# Patient Record
Sex: FEMALE | Race: AMERICAN INDIAN OR ALASKA NATIVE
[De-identification: names, ages, dates, MRNs, and addresses within clinical notes are randomized per-mention and may not be internally consistent; named-entity substitution may affect disease eponyms.]

---

## 2017-01-24 ENCOUNTER — HOSPITAL ENCOUNTER (EMERGENCY)
Dept: HOSPITAL 5 - ED | Age: 72
LOS: 1 days | Discharge: HOME | End: 2017-01-25
Payer: MEDICARE

## 2017-01-24 VITALS — DIASTOLIC BLOOD PRESSURE: 104 MMHG | SYSTOLIC BLOOD PRESSURE: 155 MMHG

## 2017-01-24 DIAGNOSIS — R05: ICD-10-CM

## 2017-01-24 DIAGNOSIS — I10: ICD-10-CM

## 2017-01-24 DIAGNOSIS — E11.9: ICD-10-CM

## 2017-01-24 DIAGNOSIS — J06.9: Primary | ICD-10-CM

## 2017-01-24 PROCEDURE — 99282 EMERGENCY DEPT VISIT SF MDM: CPT

## 2017-01-24 NOTE — EMERGENCY DEPARTMENT REPORT
HPI





- General


Chief Complaint: Upper Respiratory Infection


Time Seen by Provider: 01/24/17 23:05





- HPI


HPI: 





Patient here complaining of cold symptoms says 1 week.  Reports nonproductive 

cough.  Denies any chest pain or shortness of breath.  Denies any fever or 

chills.  Patient is a history of diabetes and high blood pressure.  She is 

currently taking blood pressure medication.  She does have a primary care 

doctor.





ED Past Medical Hx





- Past Medical History


Previous Medical History?: Yes


Hx Hypertension: Yes


Hx Diabetes: Yes


Additional medical history: Thyroid Disease





- Surgical History


Past Surgical History?: Yes


Additional Surgical History: R shoulder, hiatal hernia repair





- Family History


Family history: diabetes, hypertension





- Social History


Smoking Status: Never Smoker


Substance Use Type: None





- Medications


Home Medications: 


 Home Medications











 Medication  Instructions  Recorded  Confirmed  Last Taken  Type


 


HYDROcodone/APAP 5-325 [[place] 1 each PO Q6HR PRN #20 tablet 01/16/14  Unknown Rx





5/325 mg]     


 


Hydrochlorothiazide [Hctz] 25 mg PO DAILY 01/16/14 01/16/14 01/15/14 09:00 

History


 


Lisinopril [Zestril TAB] 20 mg PO DAILY 01/16/14 01/16/14 Unknown History


 


metFORMIN [Glucophage] 1,000 mg PO BID 01/16/14 01/16/14 01/15/14 09:00 History


 


Phenazopyridine [Pyridium] 200 mg PO TID PRN #6 tablet 05/19/14  Unknown Rx


 


Sulfamethoxazole/Trimethoprim 1 each PO BID #10 tablet 05/19/14  Unknown Rx





[Bactrim Ds]     


 


Clotrimazole [Gyne-Lotrimin] 1 applicator VG QHS #1 cream.appl 05/22/14  

Unknown Rx


 


Naproxen Sodium [Aleve] 220 mg PO Q8H PRN #50 tablet 05/22/14  Unknown Rx


 


Azithromycin [Zithromax Z-YAN] 250 mg PO DAILY #6 tab 01/24/17  Unknown Rx


 


Cetirizine HCl [ZyrTEC] 10 mg PO QDAY #10 capsule 01/24/17  Unknown Rx


 


Fluticasone [Flonase] 1 spray NS QDAY #1 bottle 01/24/17  Unknown Rx


 


guaiFENesin/CODEINE [Robitussin AC] 10 ml PO QHS #70 ml 01/25/17  Unknown Rx














ED Review of Systems


ROS: 


Stated complaint: COLD SYMPTOMS


Other details as noted in HPI





Comment: All other systems reviewed and negative


Constitutional: denies: chills, fever


Eyes: denies: eye pain


ENT: congestion.  denies: ear pain, throat pain


Respiratory: cough.  denies: shortness of breath, SOB with exertion, SOB at rest

, stridor, wheezing


Cardiovascular: denies: chest pain, palpitations, edema, syncope


Gastrointestinal: denies: abdominal pain, nausea, vomiting


Skin: denies: rash


Neurological: denies: headache, weakness, numbness, paresthesias, confusion





Physical Exam





- Physical Exam


Vital Signs: 


 Vital Signs











  01/24/17 01/24/17





  19:13 22:42


 


Temperature 98.3 F 98.8 F


 


Pulse Rate 88 99 H


 


Respiratory 18 20





Rate  


 


Blood Pressure 156/103 


 


Blood Pressure  155/104





[Right]  


 


O2 Sat by Pulse 98 





Oximetry  











General: 





This is a 71-year-old female well-nourished well-developed in no acute distress.


Physical Exam: 





Head: Normocephalic atraumatic


Mouth: Moist, no pharyngeal exudate or erythema.  Uvula is midline and oral 

airway is patent.      No facial swelling.  No peritonsillar abscesses.


Neck: Supple, no C-spine tenderness, no tracheal deviation.  Nontender to 

palpate. no adenopathy


Ears: Bilateral TMs congested without erythema.bilateral EAC without any 

redness swelling or drainage


Eyes: Bilateral pupils equal and reactive to light, bilateral EOM intact.  

Bilateral  conjunctiva with injection.  Normal accommodation


Nose: Mucosa moist, congested with erythema.  Frontal and maxillary sinuses 

nontender to palpate.  Clear nasal drainage.  Dry cough


Lungs:Clear to auscultate bilaterally no rhonchi wheezes or rales.  Normal work 

of breathing 


extremity; No CCE. +2 pulses.  No neurovascular compromise


Cardiovascular: S1-S2, regular rate rhythm.  No murmurs.


Skin: clean Dry and intact no rash no lesions 


Psych: Normal mood and behavior





ED Course


 Vital Signs











  01/24/17 01/24/17





  19:13 22:42


 


Temperature 98.3 F 98.8 F


 


Pulse Rate 88 99 H


 


Respiratory 18 20





Rate  


 


Blood Pressure 156/103 


 


Blood Pressure  155/104





[Right]  


 


O2 Sat by Pulse 98 





Oximetry  














- Reevaluation(s)


Reevaluation #1: 





01/25/17 05:22


Patient received Tylenol 650 mg by mouth in emergency room for sore throat.





ED Medical Decision Making





- Medical Decision Making





ED course: Patient receives Tylenol 650 mg in emergency room for sore throat.  

The patient as she has upper respiratory tract infection which is usually viral 

in nature but because her symptoms have been going on for 1 week and she has 

multiple comorbidities to include diabetes and high blood pressure I will place 

her on antibiotic and nasal Flonase and she should take Zyrtec.  I instructed 

her that her blood pressure was elevated today so she will need to keep a log 

and follow up with primary care physician for treatment of chronic diseases.  

Patient discharged home with prescription for Z-Yan, guaifenesin with codeine, 

Flonase and Zyrtec.


Critical care attestation.: 


If time is entered above; I have spent that time in minutes in the direct care 

of this critically ill patient, excluding procedure time.








ED Disposition


Clinical Impression: 


 Cough





Upper respiratory tract infection


Qualifiers:


 URI type: unspecified URI Qualified Code(s): J06.9 - Acute upper respiratory 

infection, unspecified





Hypertension


Qualifiers:


 Hypertension type: essential hypertension Qualified Code(s): I10 - Essential (

primary) hypertension


Disposition: DISCHARGED TO HOME OR SELFCARE


Is pt being admited?: No


Does the pt Need Aspirin: No


Condition: Stable


Instructions:  Hypertension (ED), Upper Respiratory Infection (ED), Acute Cough 

(ED)


Additional Instructions: 


Please keep a log of her blood pressure and follow up with primary care 

physician.


Take medication as prescribed.


Prescriptions: 


guaiFENesin/CODEINE [Robitussin AC] 10 ml PO QHS #70 ml


Fluticasone [Flonase] 1 spray NS QDAY #1 bottle


Azithromycin [Zithromax Z-YAN] 250 mg PO DAILY #6 tab


Cetirizine HCl [ZyrTEC] 10 mg PO QDAY #10 capsule


Referrals: 


PRIMARY CARE,MD [Primary Care Provider] - 2-3 Days


Forms:  Work/School Release Form(ED)

## 2017-07-12 ENCOUNTER — HOSPITAL ENCOUNTER (EMERGENCY)
Dept: HOSPITAL 5 - ED | Age: 72
Discharge: HOME | End: 2017-07-12
Payer: MEDICARE

## 2017-07-12 VITALS — SYSTOLIC BLOOD PRESSURE: 147 MMHG | DIASTOLIC BLOOD PRESSURE: 79 MMHG

## 2017-07-12 DIAGNOSIS — Z88.0: ICD-10-CM

## 2017-07-12 DIAGNOSIS — Z88.8: ICD-10-CM

## 2017-07-12 DIAGNOSIS — E11.9: ICD-10-CM

## 2017-07-12 DIAGNOSIS — I10: ICD-10-CM

## 2017-07-12 DIAGNOSIS — R10.9: Primary | ICD-10-CM

## 2017-07-12 DIAGNOSIS — Z87.891: ICD-10-CM

## 2017-07-12 LAB
ALBUMIN SERPL-MCNC: 4.2 G/DL (ref 3.9–5)
ALBUMIN/GLOB SERPL: 1.2 %
ALP SERPL-CCNC: 92 UNITS/L (ref 35–129)
ALT SERPL-CCNC: 7 UNITS/L (ref 7–56)
ANION GAP SERPL CALC-SCNC: 15 MMOL/L
BASOPHILS NFR BLD AUTO: 0.6 % (ref 0–1.8)
BILIRUB SERPL-MCNC: 0.5 MG/DL (ref 0.1–1.2)
BILIRUB UR QL STRIP: (no result)
BLOOD UR QL VISUAL: (no result)
BUN SERPL-MCNC: 14 MG/DL (ref 7–17)
BUN/CREAT SERPL: 23.33 %
CALCIUM SERPL-MCNC: 9.6 MG/DL (ref 8.4–10.2)
CHLORIDE SERPL-SCNC: 91.8 MMOL/L (ref 98–107)
CO2 SERPL-SCNC: 29 MMOL/L (ref 22–30)
EOSINOPHIL NFR BLD AUTO: 1.9 % (ref 0–4.3)
GLUCOSE SERPL-MCNC: 331 MG/DL (ref 65–100)
HCT VFR BLD CALC: 42 % (ref 30.3–42.9)
HGB BLD-MCNC: 13.5 GM/DL (ref 10.1–14.3)
KETONES UR STRIP-MCNC: (no result) MG/DL
LEUKOCYTE ESTERASE UR QL STRIP: (no result)
LIPASE SERPL-CCNC: 28 UNITS/L (ref 13–60)
MCH RBC QN AUTO: 28 PG (ref 28–32)
MCHC RBC AUTO-ENTMCNC: 32 % (ref 30–34)
MCV RBC AUTO: 86 FL (ref 79–97)
MUCOUS THREADS #/AREA URNS HPF: (no result) /HPF
NITRITE UR QL STRIP: (no result)
PH UR STRIP: 5 [PH] (ref 5–7)
PLATELET # BLD: 287 K/MM3 (ref 140–440)
POTASSIUM SERPL-SCNC: 3.7 MMOL/L (ref 3.6–5)
PROT SERPL-MCNC: 7.6 G/DL (ref 6.3–8.2)
PROT UR STRIP-MCNC: (no result) MG/DL
RBC # BLD AUTO: 4.91 M/MM3 (ref 3.65–5.03)
RBC #/AREA URNS HPF: 2 /HPF (ref 0–6)
SODIUM SERPL-SCNC: 132 MMOL/L (ref 137–145)
UROBILINOGEN UR-MCNC: < 2 MG/DL (ref ?–2)
WBC # BLD AUTO: 7.8 K/MM3 (ref 4.5–11)
WBC #/AREA URNS HPF: 1 /HPF (ref 0–6)

## 2017-07-12 PROCEDURE — 83690 ASSAY OF LIPASE: CPT

## 2017-07-12 PROCEDURE — 99284 EMERGENCY DEPT VISIT MOD MDM: CPT

## 2017-07-12 PROCEDURE — 81001 URINALYSIS AUTO W/SCOPE: CPT

## 2017-07-12 PROCEDURE — 74177 CT ABD & PELVIS W/CONTRAST: CPT

## 2017-07-12 PROCEDURE — 36415 COLL VENOUS BLD VENIPUNCTURE: CPT

## 2017-07-12 PROCEDURE — 85025 COMPLETE CBC W/AUTO DIFF WBC: CPT

## 2017-07-12 PROCEDURE — 80053 COMPREHEN METABOLIC PANEL: CPT

## 2017-07-12 PROCEDURE — 82962 GLUCOSE BLOOD TEST: CPT

## 2017-07-12 NOTE — CAT SCAN REPORT
FINAL REPORT



PROCEDURE:  CT abdomen and pelvis with contrast. 



TECHNIQUE:  Computerized axial tomography of the abdomen and

pelvis was performed after the IV injection of iodinated nonionic

contrast.



HISTORY:  Right flank pain. 



COMPARISON:  No prior studies are available for comparison.



FINDINGS:  

The lung bases are clear. There are no pleural effusions. The

heart size is normal. The liver, spleen and pancreas appear

normal. The gallbladder is present. The adrenal glands are not

enlarged. Both kidneys appear normal in size and configuration.

The abdominal aorta has a normal caliber. There is no

retroperitoneal adenopathy. There is a small hiatal hernia. The

unopacified gastrointestinal tract is unremarkable. A normal

appendix is visible. The bladder, uterus and adnexal regions are

unremarkable. The regional skeleton appears intact. There is

severe osteoarthritis in the facet joints of the lower lumbar

spine. 



IMPRESSION:  

Small hiatal hernia. Osteoarthritis involving the facet joints of

the lower lumbar spine. No definite signs of acute disease in the

abdomen or pelvis.

## 2017-07-12 NOTE — EMERGENCY DEPARTMENT REPORT
ED Abdominal Pain HPI





- General


Chief Complaint: Abdominal Pain


Stated Complaint: RT SIDE PAIN


Time Seen by Provider: 07/12/17 21:18


Source: patient


Mode of arrival: Ambulatory


Limitations: No Limitations





- History of Present Illness


Initial Comments: 





72-year-old female presents to the emergency department complaining of right 

flank pain.  Patient states pain has been constant for 10 days.  Pain does not 

radiate.  Initially, the pain was severe with associated nausea.  She states 

that she stopped taking her Invokana and her pain decreased.  She now describes 

the pain as a pinching sensation.  She reports her nausea has resolved.  She 

denies fever.  There are no other complaints.


MD Complaint: flank pain


-: Gradual, days(s) (10)


Location: R flank


Radiation: none


Migration to: no migration


Severity: moderate


Severity scale (0 -10): 6


Quality: other ("pinching")


Consistency: constant


Improves With: nothing


Worsens With: nothing


Associated Symptoms: nausea





- Related Data


 Home Medications











 Medication  Instructions  Recorded  Confirmed  Last Taken


 


Hydrochlorothiazide [HCTZ] 25 mg PO DAILY 01/16/14 01/16/14 07/11/17


 


Lisinopril [Zestril TAB] 20 mg PO DAILY 01/16/14 01/16/14 07/11/17


 


metFORMIN [Glucophage] 1,000 mg PO BID 01/16/14 01/16/14 07/11/17








 Previous Rx's











 Medication  Instructions  Recorded  Last Taken  Type


 


Cetirizine HCl [ZyrTEC] 10 mg PO QDAY #10 capsule 01/24/17 07/11/17 Rx


 


Fluticasone [Flonase] 1 spray NS QDAY #1 bottle 01/24/17 07/11/17 Rx


 


traMADol [Ultram] 50 mg PO Q6HR PRN #20 tablet 07/12/17 Unknown Rx











 Allergies











Allergy/AdvReac Type Severity Reaction Status Date / Time


 


metronidazole [From Flagyl] Allergy  Unknown Verified 05/18/14 23:27


 


Metronidazole HCl Allergy  Unknown Verified 05/18/14 23:27





[From Flagyl]     


 


Penicillins Allergy  Anaphylaxis Verified 01/16/14 02:01














ED Review of Systems


ROS: 


Stated complaint: RT SIDE PAIN


Other details as noted in HPI





Comment: All other systems reviewed and negative


Gastrointestinal: as per HPI (R flank pain), nausea





ED Past Medical Hx





- Past Medical History


Previous Medical History?: Yes


Hx Hypertension: Yes


Hx Diabetes: Yes


Additional medical history: Thyroid Disease





- Surgical History


Past Surgical History?: Yes


Additional Surgical History: R shoulder, hiatal hernia repair





- Family History


Family history: no significant





- Social History


Smoking Status: Former Smoker


Substance Use Type: Non Opiate Pain, Prescribed





- Medications


Home Medications: 


 Home Medications











 Medication  Instructions  Recorded  Confirmed  Last Taken  Type


 


Hydrochlorothiazide [HCTZ] 25 mg PO DAILY 01/16/14 01/16/14 07/11/17 History


 


Lisinopril [Zestril TAB] 20 mg PO DAILY 01/16/14 01/16/14 07/11/17 History


 


metFORMIN [Glucophage] 1,000 mg PO BID 01/16/14 01/16/14 07/11/17 History


 


Cetirizine HCl [ZyrTEC] 10 mg PO QDAY #10 capsule 01/24/17 07/11/17 Rx


 


Fluticasone [Flonase] 1 spray NS QDAY #1 bottle 01/24/17 07/11/17 Rx


 


traMADol [Ultram] 50 mg PO Q6HR PRN #20 tablet 07/12/17  Unknown Rx














ED Physical Exam





- General


Limitations: No Limitations


General appearance: alert, in no apparent distress





- Head


Head exam: Present: atraumatic, normocephalic





- Eye


Eye exam: Present: normal appearance, PERRL, EOMI





- ENT


ENT exam: Present: normal exam, normal orophraynx, mucous membranes moist





- Neck


Neck exam: Present: normal inspection, full ROM.  Absent: tenderness





- Respiratory


Respiratory exam: Present: normal lung sounds bilaterally.  Absent: respiratory 

distress





- Cardiovascular


Cardiovascular Exam: Present: regular rate, normal rhythm, normal heart sounds





- GI/Abdominal


GI/Abdominal exam: Present: soft, normal bowel sounds.  Absent: distended, 

tenderness





- Extremities Exam


Extremities exam: Present: normal inspection, full ROM.  Absent: tenderness





- Back Exam


Back exam: Present: normal inspection, full ROM.  Absent: tenderness, CVA 

tenderness (R), CVA tenderness (L)





- Neurological Exam


Neurological exam: Present: alert, oriented X3.  Absent: motor sensory deficit





- Skin


Skin exam: Present: warm, dry, intact





ED Course


 Vital Signs











  07/12/17 07/12/17 07/12/17





  11:57 20:12 20:53


 


Temperature 98.4 F 98.5 F 


 


Pulse Rate 68 67 


 


Respiratory 18 20 





Rate   


 


Blood Pressure 176/92 152/92 


 


Blood Pressure   





[Right]   


 


O2 Sat by Pulse 97 98 100





Oximetry   














  07/12/17 07/12/17 07/12/17





  21:01 21:11 21:14


 


Temperature   


 


Pulse Rate 63 59 L 


 


Respiratory 14 15 





Rate   


 


Blood Pressure 161/77 161/77 


 


Blood Pressure   160/80





[Right]   


 


O2 Sat by Pulse 98 98 





Oximetry   














  07/12/17 07/12/17 07/12/17





  21:21 21:30 22:19


 


Temperature   


 


Pulse Rate 66 65 71


 


Respiratory 18 15 11 L





Rate   


 


Blood Pressure 161/76 142/71 


 


Blood Pressure   





[Right]   


 


O2 Sat by Pulse 97 99 100





Oximetry   














  07/12/17 07/12/17





  22:20 22:30


 


Temperature  


 


Pulse Rate 70 67


 


Respiratory 16 15





Rate  


 


Blood Pressure 162/80 146/75


 


Blood Pressure  





[Right]  


 


O2 Sat by Pulse 99 97





Oximetry  














ED Medical Decision Making





- Lab Data


Result diagrams: 


 07/12/17 12:31





 07/12/17 12:31





- Radiology Data


Radiology results: report reviewed, image reviewed





CT of abdomen and pelvis reveals a small hiatal hernia.  There is no acute intra

-abdominal abnormality.





- Medical Decision Making





Lab and imaging results reviewed and discussed with the patient.  Patient will 

be discharged home at this time to follow up with her primary care physician.





- Differential Diagnosis


abdominal pain, pyelonephritis, kidney stone


Critical care attestation.: 


If time is entered above; I have spent that time in minutes in the direct care 

of this critically ill patient, excluding procedure time.








ED Disposition


Clinical Impression: 


 Right flank pain





Disposition: DC-01 TO HOME OR SELFCARE


Is pt being admited?: No


Condition: Stable


Instructions:  Abdominal Pain (ED)


Prescriptions: 


traMADol [Ultram] 50 mg PO Q6HR PRN #20 tablet


 PRN Reason: Pain


Referrals: 


DONOVAN OLEARY MD [Primary Care Provider] - 3-5 Days


Time of Disposition: 22:56

## 2021-02-06 ENCOUNTER — HOSPITAL ENCOUNTER (EMERGENCY)
Dept: HOSPITAL 5 - ED | Age: 76
Discharge: HOME | End: 2021-02-06
Payer: MEDICARE

## 2021-02-06 VITALS — SYSTOLIC BLOOD PRESSURE: 117 MMHG | DIASTOLIC BLOOD PRESSURE: 75 MMHG

## 2021-02-06 DIAGNOSIS — J18.9: Primary | ICD-10-CM

## 2021-02-06 DIAGNOSIS — Z20.822: ICD-10-CM

## 2021-02-06 DIAGNOSIS — Z88.8: ICD-10-CM

## 2021-02-06 DIAGNOSIS — Z88.0: ICD-10-CM

## 2021-02-06 DIAGNOSIS — Z79.899: ICD-10-CM

## 2021-02-06 DIAGNOSIS — Z98.890: ICD-10-CM

## 2021-02-06 DIAGNOSIS — I10: ICD-10-CM

## 2021-02-06 DIAGNOSIS — E11.9: ICD-10-CM

## 2021-02-06 LAB
ALBUMIN SERPL-MCNC: 3.7 G/DL (ref 3.9–5)
ALT SERPL-CCNC: 7 UNITS/L (ref 7–56)
APTT BLD: 29.8 SEC. (ref 24.2–36.6)
BASOPHILS # (AUTO): 0.1 K/MM3 (ref 0–0.1)
BASOPHILS NFR BLD AUTO: 0.8 % (ref 0–1.8)
BUN SERPL-MCNC: 15 MG/DL (ref 7–17)
BUN/CREAT SERPL: 25 %
CALCIUM SERPL-MCNC: 9.1 MG/DL (ref 8.4–10.2)
EOSINOPHIL # BLD AUTO: 0.1 K/MM3 (ref 0–0.4)
EOSINOPHIL NFR BLD AUTO: 1.4 % (ref 0–4.3)
HCT VFR BLD CALC: 38.9 % (ref 30.3–42.9)
HEMOLYSIS INDEX: 2
HGB BLD-MCNC: 12.9 GM/DL (ref 10.1–14.3)
INR PPP: 1.04 (ref 0.87–1.13)
LYMPHOCYTES # BLD AUTO: 2.9 K/MM3 (ref 1.2–5.4)
LYMPHOCYTES NFR BLD AUTO: 30.5 % (ref 13.4–35)
MCHC RBC AUTO-ENTMCNC: 33 % (ref 30–34)
MCV RBC AUTO: 88 FL (ref 79–97)
MONOCYTES # (AUTO): 0.8 K/MM3 (ref 0–0.8)
MONOCYTES % (AUTO): 8.5 % (ref 0–7.3)
PLATELET # BLD: 275 K/MM3 (ref 140–440)
RBC # BLD AUTO: 4.41 M/MM3 (ref 3.65–5.03)

## 2021-02-06 PROCEDURE — 36415 COLL VENOUS BLD VENIPUNCTURE: CPT

## 2021-02-06 PROCEDURE — 84484 ASSAY OF TROPONIN QUANT: CPT

## 2021-02-06 PROCEDURE — 85730 THROMBOPLASTIN TIME PARTIAL: CPT

## 2021-02-06 PROCEDURE — 85025 COMPLETE CBC W/AUTO DIFF WBC: CPT

## 2021-02-06 PROCEDURE — 71046 X-RAY EXAM CHEST 2 VIEWS: CPT

## 2021-02-06 PROCEDURE — 80053 COMPREHEN METABOLIC PANEL: CPT

## 2021-02-06 PROCEDURE — 93005 ELECTROCARDIOGRAM TRACING: CPT

## 2021-02-06 PROCEDURE — 85610 PROTHROMBIN TIME: CPT

## 2021-02-06 NOTE — XRAY REPORT
CHEST 2 VIEW 



INDICATION / CLINICAL INFORMATION:

Chest Pain.



COMPARISON: 

12/16/2018



FINDINGS:



SUPPORT DEVICES: None.



HEART / MEDIASTINUM: No significant abnormality. 



LUNGS / PLEURA: Mild pleural-parenchymal disease is present in the left lung base. This appearance is
 nonspecific but certainly concerning for the possibility of pneumonia with very small parapneumonic 
effusion. Right lung is grossly clear. No pneumothorax. 



ADDITIONAL FINDINGS: No significant additional findings.



IMPRESSION:

1. Mild left basilar pleural-parenchymal disease.



Signer Name: Maliha Molina MD 

Signed: 2/6/2021 2:49 PM

Workstation Name: [email or web address]

## 2021-02-06 NOTE — EVENT NOTE
ED Screening Note


ED Screening Note: 





pt presents for left neck, left shoulder, and left CP for a week


states it feels like an aching


she states she has recently had two "colds"


no n/v/d


no fever


no cough 


no leg swelling


states she does have pain with breathing 


PMHx HTN, DM, hiatal hernia, goiter


allergy: PCN





This initial assessment/diagnostic orders/clinical plan/treatment(s) is/are 

subject to change based on patients health status, clinical progression and re-

assessment by fellow clinical providers in the ED. Further treatment and workup 

at subsequent clinical providers discretion. Patient/guardian urged not to elope

from the ED as their condition may be serious if not clinically assessed and 

managed. 





Initial orders include: 


CP protocol

## 2021-02-06 NOTE — EMERGENCY DEPARTMENT REPORT
- General


Chief Complaint: Chest Pain


Stated Complaint: NECK BACK PAINS


Time Seen by Provider: 21 14:20


Source: patient


Mode of arrival: Ambulatory


Limitations: No Limitations





- History of Present Illness


Initial Comments: 





Patient is a 75-year-old F American female with a past medical history of 

hypertension diabetes who is presenting with 2 weeks of cough and congestion.  

She states she has had cold-like symptoms for this duration.  States there is no

shortness of breath.  Cough is dry.  Initially had a slight sore throat but this

is resolved.  Patient had a COVID-19 test approximately 3 weeks ago which was 

negative.  Patient is not had retesting.  Patient is complaining of some 

discomfort with her cough in the left chest underneath the left breast.  States 

is a little bit of trapezius pain on the left as well.  She denies nausea 

vomiting diarrhea.  No subjective fevers or chills.





- Related Data


                                Home Medications











 Medication  Instructions  Recorded  Confirmed  Last Taken


 


hydroCHLOROthiazide [HCTZ] 25 mg PO DAILY 14


 


lisinopriL [Zestril TAB] 20 mg PO DAILY 14


 


metFORMIN [Glucophage] 1,000 mg PO BID 14








                                  Previous Rx's











 Medication  Instructions  Recorded  Last Taken  Type


 


Cetirizine HCl [ZyrTEC 10mg cap] 10 mg PO QDAY #10 capsule 17 Rx


 


Fluticasone [Flonase] 1 spray NS QDAY #1 bottle 17 Rx


 


traMADoL [Ultram] 50 mg PO Q6HR PRN #20 tablet 17 Unknown Rx


 


Benzonatate [Tessalon Perle] 100 mg PO TID #15 capsule 18 Unknown Rx


 


Dexchlorpheniram/Phenylephrine 1 each PO Q6H #20 tab 18 Unknown Rx





[Rymed Tablet]    


 


Albuterol Mdi (or & Nicu Only) 2 puff IH QID PRN #1 inhalation 21 Unknown 

Rx





[ProAir HFA Inhaler]    


 


Benzonatate [Tessalon Perles] 100 mg PO Q8HR #10 capsule 21 Unknown Rx


 


levoFLOXacin [Levaquin TAB] 500 mg PO QDAY #7 tablet 21 Unknown Rx


 


traMADoL [Ultram] 50 mg PO Q6HR PRN #12 tablet 21 Unknown Rx











                                    Allergies











Allergy/AdvReac Type Severity Reaction Status Date / Time


 


metronidazole [From Flagyl] Allergy  Unknown Verified 14 23:27


 


Metronidazole HCl Allergy  Unknown Verified 14 23:27





[From Flagyl]     


 


Penicillins Allergy  Anaphylaxis Verified 14 02:01














ED Review of Systems


ROS: 


Stated complaint: NECK BACK PAINS


Other details as noted in HPI





Comment: All other systems reviewed and negative





ED Past Medical Hx





- Past Medical History


Previous Medical History?: Yes


Hx Hypertension: Yes


Hx Diabetes: Yes


Additional medical history: Thyroid Disease, Fatty tumors left thigh





- Surgical History


Past Surgical History?: Yes


Additional Surgical History: R shoulder, hiatal hernia repair, Cataracts





- Social History


Smoking Status: Never Smoker


Substance Use Type: None





- Medications


Home Medications: 


                                Home Medications











 Medication  Instructions  Recorded  Confirmed  Last Taken  Type


 


hydroCHLOROthiazide [HCTZ] 25 mg PO DAILY 14 History


 


lisinopriL [Zestril TAB] 20 mg PO DAILY 14 History


 


metFORMIN [Glucophage] 1,000 mg PO BID 14 History


 


Cetirizine HCl [ZyrTEC 10mg cap] 10 mg PO QDAY #10 capsule 17 Rx


 


Fluticasone [Flonase] 1 spray NS QDAY #1 bottle 17 Rx


 


traMADoL [Ultram] 50 mg PO Q6HR PRN #20 tablet 17  Unknown Rx


 


Benzonatate [Tessalon Perle] 100 mg PO TID #15 capsule 18  Unknown Rx


 


Dexchlorpheniram/Phenylephrine 1 each PO Q6H #20 tab 18  Unknown Rx





[Rymed Tablet]     


 


Albuterol Mdi (or & Nicu Only) 2 puff IH QID PRN #1 inhalation 21  Unknown

Rx





[ProAir HFA Inhaler]     


 


Benzonatate [Tessalon Perles] 100 mg PO Q8HR #10 capsule 21  Unknown Rx


 


levoFLOXacin [Levaquin TAB] 500 mg PO QDAY #7 tablet 21  Unknown Rx


 


traMADoL [Ultram] 50 mg PO Q6HR PRN #12 tablet 21  Unknown Rx














ED Physical Exam





- General


Limitations: No Limitations


General appearance: alert, in no apparent distress





- Head


Head exam: Present: atraumatic, normocephalic





- Eye


Eye exam: Present: normal appearance





- ENT


ENT exam: Present: mucous membranes moist





- Neck


Neck exam: Present: normal inspection





- Respiratory


Respiratory exam: Present: normal lung sounds bilaterally.  Absent: respiratory 

distress, wheezes, rales, rhonchi





- Cardiovascular


Cardiovascular Exam: Present: regular rate, normal rhythm, normal heart sounds. 

 Absent: systolic murmur, diastolic murmur, rubs, gallop





- GI/Abdominal


GI/Abdominal exam: Present: soft, normal bowel sounds.  Absent: distended, tend

erness, guarding, rebound, rigid





- Extremities Exam


Extremities exam: Present: normal inspection





- Back Exam


Back exam: Present: normal inspection





- Neurological Exam


Neurological exam: Present: alert, oriented X3





- Psychiatric


Psychiatric exam: Present: normal affect, normal mood





- Skin


Skin exam: Present: warm, dry, intact, normal color.  Absent: rash





ED Course


                                   Vital Signs











  21





  13:55


 


Temperature 99.4 F


 


Pulse Rate 63


 


Respiratory 18





Rate 


 


Blood Pressure 117/75


 


O2 Sat by Pulse 96





Oximetry 














ED Medical Decision Making





- Lab Data


Result diagrams: 


                                 21 14:46





                                 21 14:46








                                   Lab Results











  21 Range/Units





  14:46 14:46 14:46 


 


WBC  9.5    (4.5-11.0)  K/mm3


 


RBC  4.41    (3.65-5.03)  M/mm3


 


Hgb  12.9    (10.1-14.3)  gm/dl


 


Hct  38.9    (30.3-42.9)  %


 


MCV  88    (79-97)  fl


 


MCH  29    (28-32)  pg


 


MCHC  33    (30-34)  %


 


RDW  15.1    (13.2-15.2)  %


 


Plt Count  275    (140-440)  K/mm3


 


Lymph % (Auto)  30.5    (13.4-35.0)  %


 


Mono % (Auto)  8.5 H    (0.0-7.3)  %


 


Eos % (Auto)  1.4    (0.0-4.3)  %


 


Baso % (Auto)  0.8    (0.0-1.8)  %


 


Lymph # (Auto)  2.9    (1.2-5.4)  K/mm3


 


Mono # (Auto)  0.8    (0.0-0.8)  K/mm3


 


Eos # (Auto)  0.1    (0.0-0.4)  K/mm3


 


Baso # (Auto)  0.1    (0.0-0.1)  K/mm3


 


Seg Neutrophils %  58.8    (40.0-70.0)  %


 


Seg Neutrophils #  5.6    (1.8-7.7)  K/mm3


 


PT   13.5   (12.2-14.9)  Sec.


 


INR   1.04   (0.87-1.13)  


 


APTT   29.8   (24.2-36.6)  Sec.


 


Sodium    135 L  (137-145)  mmol/L


 


Potassium    3.9  (3.6-5.0)  mmol/L


 


Chloride    98.8  ()  mmol/L


 


Carbon Dioxide    30  (22-30)  mmol/L


 


Anion Gap    10  mmol/L


 


BUN    15  (7-17)  mg/dL


 


Creatinine    0.6  (0.6-1.2)  mg/dL


 


Estimated GFR    > 60  ml/min


 


BUN/Creatinine Ratio    25  %


 


Glucose    229 H  ()  mg/dL


 


Calcium    9.1  (8.4-10.2)  mg/dL


 


Total Bilirubin    1.00  (0.1-1.2)  mg/dL


 


AST    13  (5-40)  units/L


 


ALT    7  (7-56)  units/L


 


Alkaline Phosphatase    104  ()  units/L


 


Troponin T    < 0.010  (0.00-0.029)  ng/mL


 


Total Protein    7.6  (6.3-8.2)  g/dL


 


Albumin    3.7 L  (3.9-5)  g/dL


 


Albumin/Globulin Ratio    0.9  %














- EKG Data


-: EKG Interpreted by Me


EKG shows normal: sinus rhythm, axis, intervals, QRS complexes, ST-T waves


Rate: normal





- EKG Data


Interpretation: LVH





- Radiology Data





Patient: PAOLO LARA MR#: [id number] 


 [id number] 


 : 1945 Acct:[id number] 





 Age/Sex: 75 / F ADM Date: 21 





 Loc: ED 


 Attending Dr: 








 Ordering Physician: LUAN MAGANA 


 Date of Service: 21 


 Procedure(s): XR chest routine 2V 


 Accession Number(s): [id number] 





 cc: LUAN MAGANA 





 Fluoro Time In Minutes: 





 CHEST 2 VIEW 





INDICATION / CLINICAL INFORMATION: 


Chest Pain. 





COMPARISON: 


2018 





FINDINGS: 





SUPPORT DEVICES: None. 





HEART / MEDIASTINUM: No significant abnormality. 





LUNGS / PLEURA: Mild pleural-parenchymal disease is present in the left lung 

base. This appearance 


 is nonspecific but certainly concerning for the possibility of pneumonia with 

very small parapneu


 mirian effusion. Right lung is grossly clear. No pneumothorax. 





ADDITIONAL FINDINGS: No significant additional findings. 





IMPRESSION: 


1. Mild left basilar pleural-parenchymal disease. 





Signer Name: Maliha Molina MD 


Signed: 2021 2:49 PM 


Workstation Name: [id number] 








 Transcribed By: JR 


 Dictated By: Maliha Molina MD 


 Electronically Authenticated By: Maliha Molina MD 


 Signed Date/Time: 21 1449 





- Medical Decision Making





Course the patient may have COVID-19 however the chest x-ray is most consistent 

with atypical pneumonia likely bacterial.  She does not have a widespread 

bibasilar groundglass opacity type pattern.  Patient is not hypoxic at this 

time.  Patient given a dose of Levaquin and will be discharged with Levaquin 

since his bioavailability is the same as IV.  Patient urged to get a second 

COVID-19 test but will also be given medication for symptomatic relief.  Patient

 discharged home.


Critical care attestation.: 


If time is entered above; I have spent that time in minutes in the direct care 

of this critically ill patient, excluding procedure time.








ED Disposition


Clinical Impression: 


 Atypical pneumonia, Suspected 2019 novel coronavirus infection





Disposition: DC-01 TO HOME OR SELFCARE


Is pt being admited?: No


Does the pt Need Aspirin: No


Condition: Stable


Instructions:  COVID-19 Frequently Asked Questions, Community-Acquired 

Pneumonia, Adult


Referrals: 


PRIMARY CARE,MD [Primary Care Provider] - 3-5 Days


Time of Disposition: 16:14

## 2021-03-16 ENCOUNTER — HOSPITAL ENCOUNTER (EMERGENCY)
Dept: HOSPITAL 5 - ED | Age: 76
Discharge: HOME | End: 2021-03-16
Payer: MEDICARE

## 2021-03-16 VITALS — DIASTOLIC BLOOD PRESSURE: 58 MMHG | SYSTOLIC BLOOD PRESSURE: 143 MMHG

## 2021-03-16 DIAGNOSIS — Z88.0: ICD-10-CM

## 2021-03-16 DIAGNOSIS — I10: ICD-10-CM

## 2021-03-16 DIAGNOSIS — Z79.84: ICD-10-CM

## 2021-03-16 DIAGNOSIS — Z98.890: ICD-10-CM

## 2021-03-16 DIAGNOSIS — Z79.899: ICD-10-CM

## 2021-03-16 DIAGNOSIS — B97.89: ICD-10-CM

## 2021-03-16 DIAGNOSIS — E11.65: ICD-10-CM

## 2021-03-16 DIAGNOSIS — Z88.8: ICD-10-CM

## 2021-03-16 DIAGNOSIS — R05: ICD-10-CM

## 2021-03-16 DIAGNOSIS — J06.9: Primary | ICD-10-CM

## 2021-03-16 LAB
ALBUMIN SERPL-MCNC: 3.8 G/DL (ref 3.9–5)
ALT SERPL-CCNC: 8 UNITS/L (ref 7–56)
BASOPHILS # (AUTO): 0.1 K/MM3 (ref 0–0.1)
BASOPHILS NFR BLD AUTO: 0.8 % (ref 0–1.8)
BUN SERPL-MCNC: 15 MG/DL (ref 7–17)
BUN/CREAT SERPL: 21 %
CALCIUM SERPL-MCNC: 9.3 MG/DL (ref 8.4–10.2)
EOSINOPHIL # BLD AUTO: 0.2 K/MM3 (ref 0–0.4)
EOSINOPHIL NFR BLD AUTO: 2.8 % (ref 0–4.3)
HCT VFR BLD CALC: 41.2 % (ref 30.3–42.9)
HEMOLYSIS INDEX: 14
HGB BLD-MCNC: 13.9 GM/DL (ref 10.1–14.3)
LYMPHOCYTES # BLD AUTO: 2.4 K/MM3 (ref 1.2–5.4)
LYMPHOCYTES NFR BLD AUTO: 28.2 % (ref 13.4–35)
MCHC RBC AUTO-ENTMCNC: 34 % (ref 30–34)
MCV RBC AUTO: 88 FL (ref 79–97)
MONOCYTES # (AUTO): 0.9 K/MM3 (ref 0–0.8)
MONOCYTES % (AUTO): 10.8 % (ref 0–7.3)
PLATELET # BLD: 275 K/MM3 (ref 140–440)
RBC # BLD AUTO: 4.7 M/MM3 (ref 3.65–5.03)

## 2021-03-16 PROCEDURE — 36415 COLL VENOUS BLD VENIPUNCTURE: CPT

## 2021-03-16 PROCEDURE — 80053 COMPREHEN METABOLIC PANEL: CPT

## 2021-03-16 PROCEDURE — 85025 COMPLETE CBC W/AUTO DIFF WBC: CPT

## 2021-03-16 PROCEDURE — 71046 X-RAY EXAM CHEST 2 VIEWS: CPT

## 2021-03-16 NOTE — XRAY REPORT
CHEST 2 VIEWS 



INDICATION / CLINICAL INFORMATION:

MAIN.



COMPARISON: 

To 621



FINDINGS:



SUPPORT DEVICES: None.

HEART / MEDIASTINUM: No significant abnormality. 

LUNGS / PLEURA: No significant pulmonary or pleural abnormality. No pneumothorax. 



ADDITIONAL FINDINGS: No significant additional findings.



IMPRESSION:

No significant abnormality.



Signer Name: Alverto Nicole MD FACR 

Signed: 3/16/2021 8:34 PM

Workstation Name: [id number]

## 2021-03-16 NOTE — EMERGENCY DEPARTMENT REPORT
- General


Chief Complaint: Upper Respiratory Infection


Stated Complaint: COUGHING


Time Seen by Provider: 03/16/21 19:44


Source: patient


Mode of arrival: Ambulatory


Limitations: No Limitations





- History of Present Illness


Initial Comments: 





Patient is a 75-year-old female presents emergency room planes of dry cough that

began a week ago.  She denies any productive cough.  She states that her 

granddaughter was sick with similar symptoms approximately a week before and she

got sick shortly after.  She denies any nausea, vomiting, diarrhea, fever, 

abdominal pain, chest pain, shortness of breath, leg swelling.  Past medical 

history of diabetes and hypertension.  She is not compliant with the diet for 

diabetes or hypertension.  She has an allergy to Flagyl and penicillin.  She s

tates that she is a non-smoker.  She states that last month she had "walking 

pneumonia diagnosed in the emergency department".





- Related Data


                                Home Medications











 Medication  Instructions  Recorded  Confirmed  Last Taken


 


hydroCHLOROthiazide [HCTZ] 25 mg PO DAILY 01/16/14 01/16/14 07/11/17


 


lisinopriL [Zestril TAB] 20 mg PO DAILY 01/16/14 01/16/14 07/11/17


 


metFORMIN [Glucophage] 1,000 mg PO BID 01/16/14 01/16/14 07/11/17








                                  Previous Rx's











 Medication  Instructions  Recorded  Last Taken  Type


 


Cetirizine HCl [ZyrTEC 10mg cap] 10 mg PO QDAY #10 capsule 01/24/17 07/11/17 Rx


 


Fluticasone [Flonase] 1 spray NS QDAY #1 bottle 01/24/17 07/11/17 Rx


 


traMADoL [Ultram] 50 mg PO Q6HR PRN #20 tablet 07/12/17 Unknown Rx


 


Benzonatate [Tessalon Perle] 100 mg PO TID #15 capsule 12/16/18 Unknown Rx


 


Dexchlorpheniram/Phenylephrine 1 each PO Q6H #20 tab 12/16/18 Unknown Rx





[Rymed Tablet]    


 


Albuterol Mdi (or & Nicu Only) 2 puff IH QID PRN #1 inhalation 02/06/21 Unknown 

Rx





[ProAir HFA Inhaler]    


 


Benzonatate [Tessalon Perles] 100 mg PO Q8HR #10 capsule 02/06/21 Unknown Rx


 


levoFLOXacin [Levaquin TAB] 500 mg PO QDAY #7 tablet 02/06/21 Unknown Rx


 


traMADoL [Ultram] 50 mg PO Q6HR PRN #12 tablet 02/06/21 Unknown Rx


 


Benzonatate [Tessalon Perles] 100 mg PO Q8HR PRN #12 capsule 03/16/21 Unknown Rx


 


Cetirizine HCl [ZyrTEC 10mg cap] 10 mg PO DAILY #10 capsule 03/16/21 Unknown Rx


 


guaiFENesin/DEXTROMETHORPHAN 1 each PO Q12HR PRN #12 capsule 03/16/21 Unknown Rx





[Coricidin Hbp Chest Charles-Cough]    











                                    Allergies











Allergy/AdvReac Type Severity Reaction Status Date / Time


 


metronidazole [From Flagyl] Allergy  Unknown Verified 05/18/14 23:27


 


Metronidazole HCl Allergy  Unknown Verified 05/18/14 23:27





[From Flagyl]     


 


Penicillins Allergy  Anaphylaxis Verified 01/16/14 02:01














ED Review of Systems


ROS: 


Stated complaint: COUGHING


Other details as noted in HPI





Comment: All other systems reviewed and negative





ED Past Medical Hx





- Past Medical History


Previous Medical History?: Yes


Hx Hypertension: Yes


Hx Diabetes: Yes


Additional medical history: Hypothyroid, Fatty tumors left thigh





- Surgical History


Past Surgical History?: Yes


Additional Surgical History: R shoulder, hiatal hernia repair, Cataracts





- Social History


Smoking Status: Never Smoker


Substance Use Type: None





- Medications


Home Medications: 


                                Home Medications











 Medication  Instructions  Recorded  Confirmed  Last Taken  Type


 


hydroCHLOROthiazide [HCTZ] 25 mg PO DAILY 01/16/14 01/16/14 07/11/17 History


 


lisinopriL [Zestril TAB] 20 mg PO DAILY 01/16/14 01/16/14 07/11/17 History


 


metFORMIN [Glucophage] 1,000 mg PO BID 01/16/14 01/16/14 07/11/17 History


 


Cetirizine HCl [ZyrTEC 10mg cap] 10 mg PO QDAY #10 capsule 01/24/17 07/11/17 Rx


 


Fluticasone [Flonase] 1 spray NS QDAY #1 bottle 01/24/17 07/11/17 Rx


 


traMADoL [Ultram] 50 mg PO Q6HR PRN #20 tablet 07/12/17  Unknown Rx


 


Benzonatate [Tessalon Perle] 100 mg PO TID #15 capsule 12/16/18  Unknown Rx


 


Dexchlorpheniram/Phenylephrine 1 each PO Q6H #20 tab 12/16/18  Unknown Rx





[Rymed Tablet]     


 


Albuterol Mdi (or & Nicu Only) 2 puff IH QID PRN #1 inhalation 02/06/21  Unknown

Rx





[ProAir HFA Inhaler]     


 


Benzonatate [Tessalon Perles] 100 mg PO Q8HR #10 capsule 02/06/21  Unknown Rx


 


levoFLOXacin [Levaquin TAB] 500 mg PO QDAY #7 tablet 02/06/21  Unknown Rx


 


traMADoL [Ultram] 50 mg PO Q6HR PRN #12 tablet 02/06/21  Unknown Rx


 


Benzonatate [Tessalon Perles] 100 mg PO Q8HR PRN #12 capsule 03/16/21  Unknown 

Rx


 


Cetirizine HCl [ZyrTEC 10mg cap] 10 mg PO DAILY #10 capsule 03/16/21  Unknown Rx


 


guaiFENesin/DEXTROMETHORPHAN 1 each PO Q12HR PRN #12 capsule 03/16/21  Unknown 

Rx





[Coricidin Hbp Chest Charles-Cough]     














ED Physical Exam





- General


Limitations: No Limitations


General appearance: alert, in no apparent distress





- Head


Head exam: Present: atraumatic, normocephalic





- Eye


Eye exam: Present: normal appearance





- ENT


ENT exam: Present: mucous membranes moist





- Respiratory


Respiratory exam: Present: normal lung sounds bilaterally.  Absent: respiratory 

distress, wheezes, rales, rhonchi, stridor, chest wall tenderness, accessory 

muscle use, decreased breath sounds, prolonged expiratory





- Cardiovascular


Cardiovascular Exam: Present: regular rate, normal rhythm, normal heart sounds. 

Absent: systolic murmur, diastolic murmur, rubs, gallop





- Neurological Exam


Neurological exam: Present: alert, oriented X3





- Psychiatric


Psychiatric exam: Present: normal affect, normal mood





- Skin


Skin exam: Present: warm, dry, intact





ED Course


                                   Vital Signs











  03/16/21





  19:30


 


Temperature 99.2 F


 


Pulse Rate 66


 


Respiratory 16





Rate 


 


Blood Pressure 143/58





[Right] 


 


O2 Sat by Pulse 97





Oximetry 














ED Medical Decision Making





- Lab Data


Result diagrams: 


                                 03/16/21 19:56





                                 03/16/21 19:56








                                   Vital Signs











  03/16/21





  19:30


 


Temperature 99.2 F


 


Pulse Rate 66


 


Respiratory 16





Rate 


 


Blood Pressure 143/58





[Right] 


 


O2 Sat by Pulse 97





Oximetry 











                                   Lab Results











  03/16/21 03/16/21 Range/Units





  19:56 19:56 


 


WBC  8.7   (4.5-11.0)  K/mm3


 


RBC  4.70   (3.65-5.03)  M/mm3


 


Hgb  13.9   (10.1-14.3)  gm/dl


 


Hct  41.2   (30.3-42.9)  %


 


MCV  88   (79-97)  fl


 


MCH  30   (28-32)  pg


 


MCHC  34   (30-34)  %


 


RDW  14.1   (13.2-15.2)  %


 


Plt Count  275   (140-440)  K/mm3


 


Lymph % (Auto)  28.2   (13.4-35.0)  %


 


Mono % (Auto)  10.8 H   (0.0-7.3)  %


 


Eos % (Auto)  2.8   (0.0-4.3)  %


 


Baso % (Auto)  0.8   (0.0-1.8)  %


 


Lymph # (Auto)  2.4   (1.2-5.4)  K/mm3


 


Mono # (Auto)  0.9 H   (0.0-0.8)  K/mm3


 


Eos # (Auto)  0.2   (0.0-0.4)  K/mm3


 


Baso # (Auto)  0.1   (0.0-0.1)  K/mm3


 


Seg Neutrophils %  57.4   (40.0-70.0)  %


 


Seg Neutrophils #  5.0   (1.8-7.7)  K/mm3


 


Sodium   134 L  (137-145)  mmol/L


 


Potassium   3.9  (3.6-5.0)  mmol/L


 


Chloride   95.5 L  ()  mmol/L


 


Carbon Dioxide   25  (22-30)  mmol/L


 


Anion Gap   17  mmol/L


 


BUN   15  (7-17)  mg/dL


 


Creatinine   0.7  (0.6-1.2)  mg/dL


 


Estimated GFR   > 60  ml/min


 


BUN/Creatinine Ratio   21  %


 


Glucose   394 H  ()  mg/dL


 


Calcium   9.3  (8.4-10.2)  mg/dL


 


Total Bilirubin   0.60  (0.1-1.2)  mg/dL


 


AST   17  (5-40)  units/L


 


ALT   8  (7-56)  units/L


 


Alkaline Phosphatase   110  ()  units/L


 


Total Protein   7.8  (6.3-8.2)  g/dL


 


Albumin   3.8 L  (3.9-5)  g/dL


 


Albumin/Globulin Ratio   1.0  %














- Radiology Data


Radiology results: report reviewed





Ordering Physician: LUAN MAGANA  


Date of Service: 03/16/21  


Procedure(s): XR chest routine 2V  


Accession Number(s): [id number]  


 


cc: LUAN MAGANA   


 


Fluoro Time In Minutes:   


 


CHEST 2 VIEWS   


 


 INDICATION / CLINICAL INFORMATION:  


 MAIN.  


 


 COMPARISON:   


 To 621  


 


 FINDINGS:  


 


 SUPPORT DEVICES: None.  


 HEART / MEDIASTINUM: No significant abnormality.   


 LUNGS / PLEURA: No significant pulmonary or pleural abnormality. No 

pneumothorax.   


 


 ADDITIONAL FINDINGS: No significant additional findings.  


 


 IMPRESSION:  


 No significant abnormality.  


 


 Signer Name: Alverto Nicole MD FACR   


 Signed: 3/16/2021 8:34 PM  


 Workstation Name: [id number]   


 


 


Transcribed By: MS  


Dictated By: Alverto Nicole MD  


Electronically Authenticated By: Alverto Nicole MD    


Signed Date/Time: 03/16/21 2034                                


 


 


 


DD/DT: 03/16/21 2033                                                            

  


TD/TT:


Print





- Medical Decision Making





Patient is a 75-year-old female presents emergency room planes of dry cough that

 began a week ago.  She denies any productive cough.  She states that her 

granddaughter was sick with similar symptoms approximately a week before and she

 got sick shortly after.  She denies any nausea, vomiting, diarrhea, fever, 

abdominal pain, chest pain, shortness of breath, leg swelling.  Past medical 

history of diabetes and hypertension.  She is not compliant with the diet for 

diabetes or hypertension.  She has an allergy to Flagyl and penicillin.  She 

states that she is a non-smoker.  She states that last month she had "walking 

pneumonia diagnosed in the emergency department".  Vitals are stable.  No 

tachycardia, no hypoxia, no fever.  Breath sounds are clear bilaterally, no 

wheezing, no rales, no rhonchi.  Chest x-ray:  No significant abnormality.  Labs

 with no leukocytosis.  Patient's blood glucose is 394, patient was eating a 

rice crispy and drinking a grape soda while in the emergency department, I 

discussed the importance of lifestyle modifications with patient and the 

importance of glycemic control and following up with her primary care doctor, I 

discussed with patient the risk of chronically uncontrolled diabetes including 

but not limited to loss of eyesight, renal disease, heart disease, etc. symptoms

 likely related to mild viral URI versus allergies.  Patient given prescription 

for Coricidin, Tessalon Perles, Zyrtec.  Advised patient Please take medication 

as prescribed.  Increase your water intake.  Please follow the diet for 

diabetes, low sugar/low carbohydrate diet.  Follow-up with your primary care 

doctor.  Return to emergency room for new or worsening symptoms.


Critical care attestation.: 


If time is entered above; I have spent that time in minutes in the direct care 

of this critically ill patient, excluding procedure time.








ED Disposition


Clinical Impression: 


 Viral URI with cough, Hyperglycemia





Disposition: DC-01 TO HOME OR SELFCARE


Is pt being admited?: No


Does the pt Need Aspirin: No


Condition: Stable


Instructions:  Hyperglycemia, Viral Respiratory Infection, Diabetes Mellitus and

 Nutrition, Adult


Additional Instructions: 


Please take medication as prescribed.  Increase your water intake.  Please 

follow the diet for diabetes, low sugar/low carbohydrate diet.  Follow-up with 

your primary care doctor.  Return to emergency room for new or worsening 

symptoms.


Prescriptions: 


guaiFENesin/DEXTROMETHORPHAN [Coricidin Hbp Chest Charles-Cough] 1 each PO Q12HR 

PRN #12 capsule


 PRN Reason: cough/congestion


Benzonatate [Tessalon Perles] 100 mg PO Q8HR PRN #12 capsule


 PRN Reason: cough


Cetirizine HCl [ZyrTEC 10mg cap] 10 mg PO DAILY #10 capsule


Referrals: 


your, primary care doctor [Other] - 2-3 Days


Time of Disposition: 21:00


Print Language: ENGLISH

## 2021-06-25 ENCOUNTER — HOSPITAL ENCOUNTER (EMERGENCY)
Dept: HOSPITAL 5 - ED | Age: 76
Discharge: HOME | End: 2021-06-25
Payer: MEDICARE

## 2021-06-25 VITALS — SYSTOLIC BLOOD PRESSURE: 125 MMHG | DIASTOLIC BLOOD PRESSURE: 83 MMHG

## 2021-06-25 DIAGNOSIS — Z79.899: ICD-10-CM

## 2021-06-25 DIAGNOSIS — Z79.84: ICD-10-CM

## 2021-06-25 DIAGNOSIS — I10: Primary | ICD-10-CM

## 2021-06-25 DIAGNOSIS — Z76.0: ICD-10-CM

## 2021-06-25 DIAGNOSIS — Z88.0: ICD-10-CM

## 2021-06-25 DIAGNOSIS — Z98.890: ICD-10-CM

## 2021-06-25 DIAGNOSIS — E11.9: ICD-10-CM

## 2021-06-25 DIAGNOSIS — Z88.8: ICD-10-CM

## 2021-06-25 PROCEDURE — 99281 EMR DPT VST MAYX REQ PHY/QHP: CPT

## 2021-11-11 ENCOUNTER — HOSPITAL ENCOUNTER (EMERGENCY)
Dept: HOSPITAL 5 - ED | Age: 76
LOS: 1 days | Discharge: HOME | End: 2021-11-12
Payer: MEDICARE

## 2021-11-11 DIAGNOSIS — Z79.899: ICD-10-CM

## 2021-11-11 DIAGNOSIS — M54.2: Primary | ICD-10-CM
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
## 2021-11-11 DIAGNOSIS — E11.9: ICD-10-CM
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
## 2021-11-11 DIAGNOSIS — M54.6: ICD-10-CM

## 2021-11-11 DIAGNOSIS — I10: ICD-10-CM
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
## 2021-11-11 DIAGNOSIS — Z88.0: ICD-10-CM

## 2021-11-11 DIAGNOSIS — Z88.8: ICD-10-CM
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
## 2021-11-11 PROCEDURE — 72070 X-RAY EXAM THORAC SPINE 2VWS: CPT

## 2021-11-11 PROCEDURE — 99284 EMERGENCY DEPT VISIT MOD MDM: CPT

## 2021-11-11 PROCEDURE — 72040 X-RAY EXAM NECK SPINE 2-3 VW: CPT
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
## 2021-11-12 VITALS — DIASTOLIC BLOOD PRESSURE: 72 MMHG | SYSTOLIC BLOOD PRESSURE: 117 MMHG

## 2022-01-28 NOTE — EMERGENCY DEPARTMENT REPORT
ED General Adult HPI





- General


Chief complaint: Medical Clearance


Stated complaint: RX REFILL


Time Seen by Provider: 06/25/21 12:48


Source: patient


Mode of arrival: Ambulatory


Limitations: No Limitations





- History of Present Illness


Initial comments: 





Patient is a 76-year-old female presents emergency room with complaints of a 

medication refill.  Patient states that she went to her primary care clinic and 

reports that it was close due to someone in the clinic having Covid.  She states

that she does not know the doses of her medications.  She states that she goes 

to the [facility] pharmacy in [facility].  She denies any symptoms at all 

currently.  She denies any pain.  She has an allergy to Flagyl and penicillin.  

I spoke with the pharmacist at [facility] in [place], [place], advised 

that patient takes losartan hydrochlorothiazide 100 mg / 12.5 mg daily, 

Synthroid 75 mg daily, glyburide 4 mg twice daily, Farxiga, 10 mg once daily, 

Ziac 6.25 once daily. 





- Related Data


                                Home Medications











 Medication  Instructions  Recorded  Confirmed  Last Taken


 


hydroCHLOROthiazide [HCTZ] 25 mg PO DAILY 01/16/14 01/16/14 07/11/17


 


lisinopriL [Zestril TAB] 20 mg PO DAILY 01/16/14 01/16/14 07/11/17


 


metFORMIN [Glucophage] 1,000 mg PO BID 01/16/14 01/16/14 07/11/17








                                  Previous Rx's











 Medication  Instructions  Recorded  Last Taken  Type


 


Cetirizine HCl [ZyrTEC 10mg cap] 10 mg PO QDAY #10 capsule 01/24/17 07/11/17 Rx


 


Fluticasone [Flonase] 1 spray NS QDAY #1 bottle 01/24/17 07/11/17 Rx


 


traMADoL [Ultram] 50 mg PO Q6HR PRN #20 tablet 07/12/17 Unknown Rx


 


Benzonatate [Tessalon Perle] 100 mg PO TID #15 capsule 12/16/18 Unknown Rx


 


Dexchlorpheniram/Phenylephrine 1 each PO Q6H #20 tab 12/16/18 Unknown Rx





[Rymed Tablet]    


 


Albuterol Mdi (or & Nicu Only) 2 puff IH QID PRN #1 inhalation 02/06/21 Unknown 

Rx





[ProAir HFA Inhaler]    


 


Benzonatate [Tessalon Perles] 100 mg PO Q8HR #10 capsule 02/06/21 Unknown Rx


 


levoFLOXacin [Levaquin TAB] 500 mg PO QDAY #7 tablet 02/06/21 Unknown Rx


 


traMADoL [Ultram] 50 mg PO Q6HR PRN #12 tablet 02/06/21 Unknown Rx


 


Benzonatate [Tessalon Perles] 100 mg PO Q8HR PRN #12 capsule 03/16/21 Unknown Rx


 


Cetirizine HCl [ZyrTEC 10mg cap] 10 mg PO DAILY #10 capsule 03/16/21 Unknown Rx


 


guaiFENesin/DEXTROMETHORPHAN 1 each PO Q12HR PRN #12 capsule 03/16/21 Unknown Rx





[Coricidin Hbp Chest Charles-Cough]    


 


Dapagliflozin Propanediol [Farxiga] 10 mg PO ONCE #30 tablet 06/25/21 Unknown Rx


 


Glimepiride [Amaryl] 4 mg PO BID 30 Days #60 tablet 06/25/21 Unknown Rx


 


Levothyroxine [Synthroid] 75 mcg PO QAM #30 tablet 06/25/21 Unknown Rx


 


Losartan/Hydrochlorothiazide 1 each PO DAILY #30 tablet 06/25/21 Unknown Rx





[Losartan-Hctz 100-25 mg Tab]    











                                    Allergies











Allergy/AdvReac Type Severity Reaction Status Date / Time


 


metronidazole [From Flagyl] Allergy  Unknown Verified 05/18/14 23:27


 


Metronidazole HCl Allergy  Unknown Verified 05/18/14 23:27





[From Flagyl]     


 


Penicillins Allergy  Anaphylaxis Verified 01/16/14 02:01














ED Review of Systems


ROS: 


Stated complaint: RX REFILL


Other details as noted in HPI





Comment: All other systems reviewed and negative





ED Past Medical Hx





- Past Medical History


Previous Medical History?: Yes


Hx Hypertension: Yes


Hx Diabetes: Yes


Additional medical history: Hypothyroid, Fatty tumors left thigh





- Surgical History


Past Surgical History?: Yes


Additional Surgical History: R shoulder, hiatal hernia repair, Cataracts





- Social History


Smoking Status: Never Smoker


Substance Use Type: None





- Medications


Home Medications: 


                                Home Medications











 Medication  Instructions  Recorded  Confirmed  Last Taken  Type


 


hydroCHLOROthiazide [HCTZ] 25 mg PO DAILY 01/16/14 01/16/14 07/11/17 History


 


lisinopriL [Zestril TAB] 20 mg PO DAILY 01/16/14 01/16/14 07/11/17 History


 


metFORMIN [Glucophage] 1,000 mg PO BID 01/16/14 01/16/14 07/11/17 History


 


Cetirizine HCl [ZyrTEC 10mg cap] 10 mg PO QDAY #10 capsule 01/24/17 07/11/17 Rx


 


Fluticasone [Flonase] 1 spray NS QDAY #1 bottle 01/24/17 07/11/17 Rx


 


traMADoL [Ultram] 50 mg PO Q6HR PRN #20 tablet 07/12/17  Unknown Rx


 


Benzonatate [Tessalon Perle] 100 mg PO TID #15 capsule 12/16/18  Unknown Rx


 


Dexchlorpheniram/Phenylephrine 1 each PO Q6H #20 tab 12/16/18  Unknown Rx





[Rymed Tablet]     


 


Albuterol Mdi (or & Nicu Only) 2 puff IH QID PRN #1 inhalation 02/06/21  Unknown

Rx





[ProAir HFA Inhaler]     


 


Benzonatate [Tessalon Perles] 100 mg PO Q8HR #10 capsule 02/06/21  Unknown Rx


 


levoFLOXacin [Levaquin TAB] 500 mg PO QDAY #7 tablet 02/06/21  Unknown Rx


 


traMADoL [Ultram] 50 mg PO Q6HR PRN #12 tablet 02/06/21  Unknown Rx


 


Benzonatate [Tessalon Perles] 100 mg PO Q8HR PRN #12 capsule 03/16/21  Unknown 

Rx


 


Cetirizine HCl [ZyrTEC 10mg cap] 10 mg PO DAILY #10 capsule 03/16/21  Unknown Rx


 


guaiFENesin/DEXTROMETHORPHAN 1 each PO Q12HR PRN #12 capsule 03/16/21  Unknown 

Rx





[Coricidin Hbp Chest Charles-Cough]     


 


Dapagliflozin Propanediol [Farxiga] 10 mg PO ONCE #30 tablet 06/25/21  Unknown 

Rx


 


Glimepiride [Amaryl] 4 mg PO BID 30 Days #60 tablet 06/25/21  Unknown Rx


 


Levothyroxine [Synthroid] 75 mcg PO QAM #30 tablet 06/25/21  Unknown Rx


 


Losartan/Hydrochlorothiazide 1 each PO DAILY #30 tablet 06/25/21  Unknown Rx





[Losartan-Hctz 100-25 mg Tab]     














ED Physical Exam





- General


Limitations: No Limitations


General appearance: alert, in no apparent distress





- Head


Head exam: Present: atraumatic, normocephalic





- Eye


Eye exam: Present: normal appearance





- ENT


ENT exam: Present: mucous membranes moist





- Respiratory


Respiratory exam: Present: normal lung sounds bilaterally.  Absent: respiratory 

distress, wheezes, rales, rhonchi, stridor, chest wall tenderness, accessory 

muscle use, decreased breath sounds, prolonged expiratory





- Cardiovascular


Cardiovascular Exam: Present: regular rate, normal rhythm, normal heart sounds. 

Absent: systolic murmur, diastolic murmur, rubs, gallop





- Neurological Exam


Neurological exam: Present: alert, oriented X3





- Psychiatric


Psychiatric exam: Present: normal affect, normal mood





- Skin


Skin exam: Present: warm, dry, intact





ED Course


                                   Vital Signs











  06/25/21





  12:41


 


Temperature 98.9 F


 


Pulse Rate 83


 


Respiratory 18





Rate 


 


Blood Pressure 125/83





[Right] 


 


O2 Sat by Pulse 100





Oximetry 














ED Medical Decision Making





- Medical Decision Making





Patient is a 76-year-old female presents emergency room with complaints of a 

medication refill.  Patient states that she went to her primary care clinic and 

reports that it was close due to someone in the clinic having Covid.  She states

 that she does not know the doses of her medications.  She states that she goes 

to the [facility] pharmacy in [facility].  She denies any symptoms at all 

currently.  She denies any pain.  She has an allergy to Flagyl and penicillin.  

I spoke with the pharmacist at [facility] pharmacy in [place], [place], advised 

that patient takes losartan hydrochlorothiazide 100 mg / 12.5 mg daily, 

Synthroid 75 mg daily, glyburide 4 mg twice daily, Farxiga, 10 mg once daily, 

Ziac 6.25 once daily. given that patient is already on losartan hctz and is 

already on a thiazide diuretic will hold off on prescribing ziac at this time. 

discussed the importance of PCP follow up for management of her chronic con

ditions. discussed with Dr. dionicio reilly, ER attending who advised to give pt a 30 

day refill of her medication. advised pt Please take medication as prescribed.  

Increase your fluid intake.  Please keep a blood pressure log and take this to 

your primary care doctor.  Please check your blood sugar regularly.  Follow-up 

with your primary care doctor.  Return to emergency room for new or worse 

symptoms.


Critical care attestation.: 


If time is entered above; I have spent that time in minutes in the direct care 

of this critically ill patient, excluding procedure time.








ED Disposition


Clinical Impression: 


 Medication refill





Disposition: DC-01 TO HOME OR SELFCARE


Is pt being admited?: No


Does the pt Need Aspirin: No


Condition: Stable


Additional Instructions: 


Please take medication as prescribed.  Increase your fluid intake.  Please keep 

a blood pressure log and take this to your primary care doctor.  Please check 

your blood sugar regularly.  Follow-up with your primary care doctor.  Return to

 emergency room for new or worse symptoms.


Prescriptions: 


Glimepiride [Amaryl] 4 mg PO BID 30 Days #60 tablet


Dapagliflozin Propanediol [Farxiga] 10 mg PO ONCE #30 tablet


Losartan/Hydrochlorothiazide [Losartan-Hctz 100-25 mg Tab] 1 each PO DAILY #30 

tablet


Levothyroxine [Synthroid] 75 mcg PO QAM #30 tablet


Referrals: 


LAUREEN ACOSTA MD [Staff Physician] - 2-3 Days


[facility] [place] [Provider Group] - 2-3 Days


MARLENY TY MD [Staff Physician] - 2-3 Days


Time of Disposition: 12:57


Print Language: ENGLISH No

## 2022-03-06 ENCOUNTER — HOSPITAL ENCOUNTER (EMERGENCY)
Dept: HOSPITAL 5 - ED | Age: 77
Discharge: HOME | End: 2022-03-06
Payer: MEDICARE

## 2022-03-06 VITALS — DIASTOLIC BLOOD PRESSURE: 70 MMHG | SYSTOLIC BLOOD PRESSURE: 113 MMHG

## 2022-03-06 DIAGNOSIS — Z88.0: ICD-10-CM

## 2022-03-06 DIAGNOSIS — E11.9: ICD-10-CM

## 2022-03-06 DIAGNOSIS — J06.9: Primary | ICD-10-CM

## 2022-03-06 DIAGNOSIS — I10: ICD-10-CM

## 2022-03-06 DIAGNOSIS — Z91.09: ICD-10-CM

## 2022-03-06 PROCEDURE — 99282 EMERGENCY DEPT VISIT SF MDM: CPT

## 2022-03-06 PROCEDURE — 82962 GLUCOSE BLOOD TEST: CPT

## 2022-03-06 NOTE — EMERGENCY DEPARTMENT REPORT
ED ENT HPI





- General


Chief complaint: Medical Clearance


Stated complaint: COLD SYMPTOMS


Time Seen by Provider: 03/06/22 21:20


Source: patient


Mode of arrival: Ambulatory


Limitations: No Limitations





- History of Present Illness


Initial comments: 





76-year-old black female with a past medical history of hypertension, diabetes, 

goiter, and hiatal hernia presents to the emergency department for evaluation of

few day history of cough and congestion.  She states that last week, her issues 

were stopped up but that has since resolved.  She denies fever but states she 

just has had a persistent cough.  She denies chest pain, shortness of breath, 

swelling.


MD complaint: other (Cough and congestion)


-: Gradual, days(s) (Several)


Severity: moderate


Consistency: intermittent


Improves with: other medication


Associated Symptoms: cough, rhinorrhea.  denies: fever, gum swelling, toothache,

pain with swallowing, sore throat, tinnitus, hearing loss, discharge from ear





- Related Data


                                Home Medications











 Medication  Instructions  Recorded  Confirmed  Last Taken


 


hydroCHLOROthiazide [HCTZ] 25 mg PO DAILY 01/16/14 01/16/14 07/11/17


 


lisinopriL [Zestril TAB] 20 mg PO DAILY 01/16/14 01/16/14 07/11/17


 


metFORMIN [Glucophage] 1,000 mg PO BID 01/16/14 01/16/14 07/11/17








                                  Previous Rx's











 Medication  Instructions  Recorded  Last Taken  Type


 


Cetirizine HCl [ZyrTEC 10mg cap] 10 mg PO QDAY #10 capsule 01/24/17 07/11/17 Rx


 


Fluticasone [Flonase] 1 spray NS QDAY #1 bottle 01/24/17 07/11/17 Rx


 


traMADoL [Ultram] 50 mg PO Q6HR PRN #20 tablet 07/12/17 Unknown Rx


 


Benzonatate [Tessalon Perle] 100 mg PO TID #15 capsule 12/16/18 Unknown Rx


 


Dexchlorpheniram/Phenylephrine 1 each PO Q6H #20 tab 12/16/18 Unknown Rx





[Rymed Tablet]    


 


Albuterol Mdi (or & Nicu Only) 2 puff IH QID PRN #1 inhalation 02/06/21 Unknown 

Rx





[ProAir HFA Inhaler]    


 


Benzonatate [Tessalon Perles] 100 mg PO Q8HR #10 capsule 02/06/21 Unknown Rx


 


levoFLOXacin [Levaquin TAB] 500 mg PO QDAY #7 tablet 02/06/21 Unknown Rx


 


traMADoL [Ultram] 50 mg PO Q6HR PRN #12 tablet 02/06/21 Unknown Rx


 


Benzonatate [Tessalon Perles] 100 mg PO Q8HR PRN #12 capsule 03/16/21 Unknown Rx


 


Cetirizine HCl [ZyrTEC 10mg cap] 10 mg PO DAILY #10 capsule 03/16/21 Unknown Rx


 


guaiFENesin/DEXTROMETHORPHAN 1 each PO Q12HR PRN #12 capsule 03/16/21 Unknown Rx





[Coricidin Hbp Chest Charles-Cough]    


 


Dapagliflozin Propanediol [Farxiga] 10 mg PO ONCE #30 tablet 06/25/21 Unknown Rx


 


Glimepiride [Amaryl] 4 mg PO BID 30 Days #60 tablet 06/25/21 Unknown Rx


 


Levothyroxine [Synthroid] 75 mcg PO QAM #30 tablet 06/25/21 Unknown Rx


 


Losartan/Hydrochlorothiazide 1 each PO DAILY #30 tablet 06/25/21 Unknown Rx





[Losartan-Hctz 100-25 mg Tab]    


 


Acetaminophen [Tylenol] 650 mg PO Q8HR PRN #20 capsule 11/12/21 Unknown Rx


 


methOCARBAMOL [Robaxin TAB] 500 mg PO BID PRN #14 tab 11/12/21 Unknown Rx


 


guaiFENesin/CODEINE [Robitussin AC] 5 ml PO TID PRN #120 ml 03/06/22 Unknown Rx











                                    Allergies











Allergy/AdvReac Type Severity Reaction Status Date / Time


 


metronidazole [From Flagyl] Allergy  Unknown Verified 05/18/14 23:27


 


Metronidazole HCl Allergy  Unknown Verified 05/18/14 23:27





[From Flagyl]     


 


Penicillins Allergy  Anaphylaxis Verified 01/16/14 02:01














ED Dental HPI





- General


Chief complaint: Medical Clearance


Stated complaint: COLD SYMPTOMS


Time Seen by Provider: 03/06/22 21:20


Source: patient


Mode of arrival: Ambulatory


Limitations: No Limitations





- Related Data


                                Home Medications











 Medication  Instructions  Recorded  Confirmed  Last Taken


 


hydroCHLOROthiazide [HCTZ] 25 mg PO DAILY 01/16/14 01/16/14 07/11/17


 


lisinopriL [Zestril TAB] 20 mg PO DAILY 01/16/14 01/16/14 07/11/17


 


metFORMIN [Glucophage] 1,000 mg PO BID 01/16/14 01/16/14 07/11/17








                                  Previous Rx's











 Medication  Instructions  Recorded  Last Taken  Type


 


Cetirizine HCl [ZyrTEC 10mg cap] 10 mg PO QDAY #10 capsule 01/24/17 07/11/17 Rx


 


Fluticasone [Flonase] 1 spray NS QDAY #1 bottle 01/24/17 07/11/17 Rx


 


traMADoL [Ultram] 50 mg PO Q6HR PRN #20 tablet 07/12/17 Unknown Rx


 


Benzonatate [Tessalon Perle] 100 mg PO TID #15 capsule 12/16/18 Unknown Rx


 


Dexchlorpheniram/Phenylephrine 1 each PO Q6H #20 tab 12/16/18 Unknown Rx





[Rymed Tablet]    


 


Albuterol Mdi (or & Nicu Only) 2 puff IH QID PRN #1 inhalation 02/06/21 Unknown 

Rx





[ProAir HFA Inhaler]    


 


Benzonatate [Tessalon Perles] 100 mg PO Q8HR #10 capsule 02/06/21 Unknown Rx


 


levoFLOXacin [Levaquin TAB] 500 mg PO QDAY #7 tablet 02/06/21 Unknown Rx


 


traMADoL [Ultram] 50 mg PO Q6HR PRN #12 tablet 02/06/21 Unknown Rx


 


Benzonatate [Tessalon Perles] 100 mg PO Q8HR PRN #12 capsule 03/16/21 Unknown Rx


 


Cetirizine HCl [ZyrTEC 10mg cap] 10 mg PO DAILY #10 capsule 03/16/21 Unknown Rx


 


guaiFENesin/DEXTROMETHORPHAN 1 each PO Q12HR PRN #12 capsule 03/16/21 Unknown Rx





[Coricidin Hbp Chest Charles-Cough]    


 


Dapagliflozin Propanediol [Farxiga] 10 mg PO ONCE #30 tablet 06/25/21 Unknown Rx


 


Glimepiride [Amaryl] 4 mg PO BID 30 Days #60 tablet 06/25/21 Unknown Rx


 


Levothyroxine [Synthroid] 75 mcg PO QAM #30 tablet 06/25/21 Unknown Rx


 


Losartan/Hydrochlorothiazide 1 each PO DAILY #30 tablet 06/25/21 Unknown Rx





[Losartan-Hctz 100-25 mg Tab]    


 


Acetaminophen [Tylenol] 650 mg PO Q8HR PRN #20 capsule 11/12/21 Unknown Rx


 


methOCARBAMOL [Robaxin TAB] 500 mg PO BID PRN #14 tab 11/12/21 Unknown Rx


 


guaiFENesin/CODEINE [Robitussin AC] 5 ml PO TID PRN #120 ml 03/06/22 Unknown Rx











                                    Allergies











Allergy/AdvReac Type Severity Reaction Status Date / Time


 


metronidazole [From Flagyl] Allergy  Unknown Verified 05/18/14 23:27


 


Metronidazole HCl Allergy  Unknown Verified 05/18/14 23:27





[From Flagyl]     


 


Penicillins Allergy  Anaphylaxis Verified 01/16/14 02:01














ED Review of Systems


ROS: 


Stated complaint: COLD SYMPTOMS


Other details as noted in HPI





Comment: All other systems reviewed and negative


Constitutional: denies: chills, diaphoresis, fever, weakness


Eyes: denies: eye pain


ENT: denies: ear pain


Respiratory: denies: cough, orthopnea, shortness of breath, SOB with exertion, 

SOB at rest, wheezing


Cardiovascular: denies: chest pain, palpitations, dyspnea on exertion, 

orthopnea, edema, syncope, paroxysmal nocturnal dyspnea


Endocrine: no symptoms reported


Gastrointestinal: denies: abdominal pain, nausea, diarrhea, hematemesis, melena,

hematochezia


Genitourinary: denies: urgency, frequency, hematuria


Musculoskeletal: denies: back pain


Skin: denies: rash, lesions


Neurological: denies: headache, weakness, numbness, confusion, abnormal gait


Psychiatric: denies: anxiety, depression


Hematological/Lymphatic: denies: easy bleeding, easy bruising





ED Past Medical Hx





- Past Medical History


Hx Hypertension: Yes


Hx Diabetes: Yes


Additional medical history: Hypothyroid, Fatty tumors left thigh, hiatal hernia





- Surgical History


Additional Surgical History: R shoulder, hiatal hernia repair, Cataracts





- Social History


Smoking Status: Never Smoker


Substance Use Type: None





- Medications


Home Medications: 


                                Home Medications











 Medication  Instructions  Recorded  Confirmed  Last Taken  Type


 


hydroCHLOROthiazide [HCTZ] 25 mg PO DAILY 01/16/14 01/16/14 07/11/17 History


 


lisinopriL [Zestril TAB] 20 mg PO DAILY 01/16/14 01/16/14 07/11/17 History


 


metFORMIN [Glucophage] 1,000 mg PO BID 01/16/14 01/16/14 07/11/17 History


 


Cetirizine HCl [ZyrTEC 10mg cap] 10 mg PO QDAY #10 capsule 01/24/17  07/11/17 Rx


 


Fluticasone [Flonase] 1 spray NS QDAY #1 bottle 01/24/17 07/11/17 Rx


 


traMADoL [Ultram] 50 mg PO Q6HR PRN #20 tablet 07/12/17  Unknown Rx


 


Benzonatate [Tessalon Perle] 100 mg PO TID #15 capsule 12/16/18  Unknown Rx


 


Dexchlorpheniram/Phenylephrine 1 each PO Q6H #20 tab 12/16/18  Unknown Rx





[Rymed Tablet]     


 


Albuterol Mdi (or & Nicu Only) 2 puff IH QID PRN #1 inhalation 02/06/21  Unknown

Rx





[ProAir HFA Inhaler]     


 


Benzonatate [Tessalon Perles] 100 mg PO Q8HR #10 capsule 02/06/21  Unknown Rx


 


levoFLOXacin [Levaquin TAB] 500 mg PO QDAY #7 tablet 02/06/21  Unknown Rx


 


traMADoL [Ultram] 50 mg PO Q6HR PRN #12 tablet 02/06/21  Unknown Rx


 


Benzonatate [Tessalon Perles] 100 mg PO Q8HR PRN #12 capsule 03/16/21  Unknown 

Rx


 


Cetirizine HCl [ZyrTEC 10mg cap] 10 mg PO DAILY #10 capsule 03/16/21  Unknown Rx


 


guaiFENesin/DEXTROMETHORPHAN 1 each PO Q12HR PRN #12 capsule 03/16/21  Unknown 

Rx





[Coricidin Hbp Chest Charles-Cough]     


 


Dapagliflozin Propanediol [Farxiga] 10 mg PO ONCE #30 tablet 06/25/21  Unknown 

Rx


 


Glimepiride [Amaryl] 4 mg PO BID 30 Days #60 tablet 06/25/21  Unknown Rx


 


Levothyroxine [Synthroid] 75 mcg PO QAM #30 tablet 06/25/21  Unknown Rx


 


Losartan/Hydrochlorothiazide 1 each PO DAILY #30 tablet 06/25/21  Unknown Rx





[Losartan-Hctz 100-25 mg Tab]     


 


Acetaminophen [Tylenol] 650 mg PO Q8HR PRN #20 capsule 11/12/21  Unknown Rx


 


methOCARBAMOL [Robaxin TAB] 500 mg PO BID PRN #14 tab 11/12/21  Unknown Rx


 


guaiFENesin/CODEINE [Robitussin AC] 5 ml PO TID PRN #120 ml 03/06/22  Unknown Rx














ED Physical Exam





- General


Limitations: No Limitations


General appearance: alert, in no apparent distress





- Head


Head exam: Present: atraumatic, normocephalic





- Eye


Eye exam: Absent: conjunctival injection





- ENT


ENT exam: Present: normal exam, normal orophraynx, mucous membranes moist, TM's 

normal bilaterally, normal external ear exam





- Neck


Neck exam: Present: normal inspection.  Absent: tenderness, lymphadenopathy





- Respiratory


Respiratory exam: Present: normal lung sounds bilaterally.  Absent: respiratory 

distress, wheezes, rales, rhonchi, stridor, chest wall tenderness, accessory 

muscle use





- Cardiovascular


Cardiovascular Exam: Present: regular rate, normal heart sounds





- GI/Abdominal


GI/Abdominal exam: Present: soft, normal bowel sounds.  Absent: distended, 

tenderness, guarding, rebound, rigid





- Extremities Exam


Extremities exam: Present: normal inspection.  Absent: tenderness





- Back Exam


Back exam: Present: normal inspection.  Absent: tenderness, CVA tenderness (R), 

CVA tenderness (L)





- Neurological Exam


Neurological exam: Present: alert, oriented X3





- Psychiatric


Psychiatric exam: Present: normal affect, normal mood





- Skin


Skin exam: Present: warm, dry, intact, normal color





ED Course


                                   Vital Signs











  03/06/22 03/06/22





  19:09 21:41


 


Temperature 98.7 F 


 


Pulse Rate 73 89


 


Respiratory 20 16





Rate  


 


Blood Pressure 107/69 113/70





[Right]  


 


O2 Sat by Pulse 97 97





Oximetry  














ED Medical Decision Making





- Medical Decision Making


76-year-old black female with a past medical history of hypertension, diabetes, 

goiter, and hiatal hernia presents to the emergency department for evaluation of

 few day history of cough and congestion.  She states that last week, her issues

 were stopped up but that has since resolved.  She denies fever but states she 

just has had a persistent cough.  She denies chest pain, shortness of breath, 

swelling.





No gross abnormalities noted on assessment.  Patient continues to deny fever or 

shortness of breath.  Exam consistent with URI with cough and congestion.  She 

will be treated with as needed Robitussin-AC and advised to follow-up with 

primary care provider if no improvement or worsening symptoms.  She verbalized 

understanding of and agreement with plan of care.





Critical care attestation.: 


If time is entered above; I have spent that time in minutes in the direct care 

of this critically ill patient, excluding procedure time.








ED Disposition


Clinical Impression: 


 URI with cough and congestion





Disposition: 01 HOME / SELF CARE / HOMELESS


Is pt being admited?: No


Does the pt Need Aspirin: No


Condition: Stable


Instructions:  Cough, Adult, Easy-to-Read, Upper Respiratory Infection, Adult, 

Easy-to-Read


Additional Instructions: 


Take medications as prescribed.  Follow-up with primary care provider if no 

improvement or worsening symptoms.


Prescriptions: 


guaiFENesin/CODEINE [Robitussin AC] 5 ml PO TID PRN #120 ml


 PRN Reason: Cough


Referrals: 


LIT DOMINGUEZ MD [Referring] - 3-5 Days


Time of Disposition: 21:35

## 2022-05-03 ENCOUNTER — HOSPITAL ENCOUNTER (EMERGENCY)
Dept: HOSPITAL 5 - ED | Age: 77
LOS: 1 days | Discharge: HOME | End: 2022-05-04
Payer: MEDICARE

## 2022-05-03 DIAGNOSIS — E11.9: ICD-10-CM
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
## 2022-05-03 DIAGNOSIS — S91.209A: Primary | ICD-10-CM
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
## 2022-05-03 DIAGNOSIS — Z88.1: ICD-10-CM

## 2022-05-03 DIAGNOSIS — Z88.0: ICD-10-CM

## 2022-05-03 DIAGNOSIS — I10: ICD-10-CM

## 2022-05-03 PROCEDURE — 99283 EMERGENCY DEPT VISIT LOW MDM: CPT

## 2022-05-04 VITALS — SYSTOLIC BLOOD PRESSURE: 133 MMHG | DIASTOLIC BLOOD PRESSURE: 86 MMHG

## 2022-05-04 NOTE — EMERGENCY DEPARTMENT REPORT
ED Lower Extremity HPI





- General


Chief Complaint: Extremity Problem,Nontraumatic


Stated Complaint: TOE IS BLACK/DIABETIC


Time Seen by Provider: 22 09:24


Source: patient


Mode of arrival: Ambulatory


Limitations: No Limitations





- History of Present Illness


Initial Comments: 





77-year-old -American female presents to the emergency room complaining 

of left foot fourth digit toenail discoloration.  Patient denies any pain denies

any swelling states she is able to move her foot and toes no difficulty walking.

 She does have a history of diabetes.  This she noticed about 9 days ago.





- Related Data


                                  Previous Rx's











 Medication  Instructions  Recorded  Last Taken  Type


 


Cetirizine HCl [ZyrTEC 10mg cap] 10 mg PO QDAY #10 capsule 17 Rx


 


Fluticasone [Flonase] 1 spray NS QDAY #1 bottle 17 Rx


 


Benzonatate [Tessalon Perle] 100 mg PO TID #15 capsule 18 Unknown Rx


 


Dexchlorpheniram/Phenylephrine 1 each PO Q6H #20 tab 18 Unknown Rx





[Rymed Tablet]    


 


Albuterol Mdi (or & Nicu Only) 2 puff IH QID PRN #1 inhalation 21 Unknown 

Rx





[ProAir HFA Inhaler]    


 


Benzonatate [Tessalon Perles] 100 mg PO Q8HR #10 capsule 21 Unknown Rx


 


Benzonatate [Tessalon Perles] 100 mg PO Q8HR PRN #12 capsule 21 Unknown Rx


 


Cetirizine HCl [ZyrTEC 10mg cap] 10 mg PO DAILY #10 capsule 21 Unknown Rx


 


guaiFENesin/DEXTROMETHORPHAN 1 each PO Q12HR PRN #12 capsule 21 Unknown Rx





[Coricidin Hbp Chest Charles-Cough]    


 


Dapagliflozin Propanediol [Farxiga] 10 mg PO ONCE #30 tablet 21 Unknown Rx


 


Glimepiride [Amaryl] 4 mg PO BID 30 Days #60 tablet 21 Unknown Rx


 


Levothyroxine [Synthroid] 75 mcg PO QAM #30 tablet 21 Unknown Rx


 


Losartan/Hydrochlorothiazide 1 each PO DAILY #30 tablet 21 Unknown Rx





[Losartan-Hctz 100-25 mg Tab]    


 


Acetaminophen [Tylenol] 650 mg PO Q8HR PRN #20 capsule 21 Unknown Rx











                                    Allergies











Allergy/AdvReac Type Severity Reaction Status Date / Time


 


metronidazole [From Flagyl] Allergy  Unknown Verified 14 23:27


 


Metronidazole HCl Allergy  Unknown Verified 14 23:27





[From Flagyl]     


 


Penicillins Allergy  Anaphylaxis Verified 14 02:01














ED Review of Systems


ROS: 


Stated complaint: TOE IS BLACK/DIABETIC


Other details as noted in HPI





Comment: All other systems reviewed and negative





ED Past Medical Hx





- Past Medical History


Hx Hypertension: Yes


Hx Diabetes: Yes


Additional medical history: Hypothyroid, Fatty tumors left thigh, hiatal hernia





- Surgical History


Additional Surgical History: R shoulder, hiatal hernia repair, Cataracts





- Social History


Smoking Status: Never Smoker


Substance Use Type: None





- Medications


Home Medications: 


                                Home Medications











 Medication  Instructions  Recorded  Confirmed  Last Taken  Type


 


Cetirizine HCl [ZyrTEC 10mg cap] 10 mg PO QDAY #10 capsule 17 Rx


 


Fluticasone [Flonase] 1 spray NS QDAY #1 bottle 17 Rx


 


Benzonatate [Tessalon Perle] 100 mg PO TID #15 capsule 18 Unknown

Rx


 


Dexchlorpheniram/Phenylephrine 1 each PO Q6H #20 tab 18 Unknown 

Rx





[Rymed Tablet]     


 


Albuterol Mdi (or & Nicu Only) 2 puff IH QID PRN #1 inhalation 21

Unknown Rx





[ProAir HFA Inhaler]     


 


Benzonatate [Tessalon Perles] 100 mg PO Q8HR #10 capsule 21 

Unknown Rx


 


Benzonatate [Tessalon Perles] 100 mg PO Q8HR PRN #12 capsule 21 

Unknown Rx


 


Cetirizine HCl [ZyrTEC 10mg cap] 10 mg PO DAILY #10 capsule 21 

Unknown Rx


 


guaiFENesin/DEXTROMETHORPHAN 1 each PO Q12HR PRN #12 capsule 21 

Unknown Rx





[Coricidin Hbp Chest Charles-Cough]     


 


Dapagliflozin Propanediol [Farxiga] 10 mg PO ONCE #30 tablet 21 

Unknown Rx


 


Glimepiride [Amaryl] 4 mg PO BID 30 Days #60 tablet 21 Unknown Rx


 


Levothyroxine [Synthroid] 75 mcg PO QAM #30 tablet 21 Unknown Rx


 


Losartan/Hydrochlorothiazide 1 each PO DAILY #30 tablet 21 

Unknown Rx





[Losartan-Hctz 100-25 mg Tab]     


 


Acetaminophen [Tylenol] 650 mg PO Q8HR PRN #20 capsule 21 Unknown

 Rx














ED Physical Exam





- General


Limitations: No Limitations


General appearance: alert, in no apparent distress





- Head


Head exam: Present: atraumatic, normocephalic





- Eye


Eye exam: Present: normal appearance





- ENT


ENT exam: Present: normal external ear exam





- Neck


Neck exam: Present: normal inspection, full ROM





- Respiratory


Respiratory exam: Absent: respiratory distress





- Cardiovascular


Cardiovascular Exam: Present: regular rate





- Extremities Exam


Extremities exam: Present: full ROM, other (Left  foot 4th toenail dark thick, 

nonerythematous mild tenderness full range of motion).  Absent: normal capillary

 refill





- Back Exam


Back exam: Present: normal inspection, full ROM





ED Course





                                   Vital Signs











  22





  22:20


 


Temperature 98.5 F


 


Pulse Rate 74


 


Respiratory 18





Rate 


 


Blood Pressure 142/64


 


O2 Sat by Pulse 97





Oximetry 














ED Lower Extremity MDM





- Radiology Data


Radiology results: report reviewed





[facility]  


                                     [address] [place] [address]  


 


                                            XRay Report   


                                               Signed  


 


Patient: PAOLO LARA                                                   

             MR#: [id number]  


[id number]          


: 1945                                                                

Acct:[id number]      


 


Age/Sex: 77 / F                                                                

ADM Date: 22     


 


Loc: ED       


Attending Dr:   


 


 


Ordering Physician: RUPAL LOMAS MD  


Date of Service: 22  


Procedure(s): XR toe(s) 2+V LT  


Accession Number(s): [id number]  


 


cc: RUPAL LOMAS MD   


 


Fluoro Time In Minutes:   


 


LEFT TOE(S) 3 VIEW(S)  


 


 INDICATION / CLINICAL INFORMATION: TOES BLACK; ANY OSTEOMYELITIS  


 


 COMPARISON: None available.  


 


 FINDINGS:  


 


 Narrowing of interphalangeal joints involving multiple toes. Small bony 

exostoses along distal 


phalanx great toe and fifth toe. No acute fractures. No specific osseous 

findings to suggest acute 


osteomyelitis.  


 


 IMPRESSION:  


 1. No radiographic findings to clearly suggest osteomyelitis. MRI of the foot 

may be useful for 


further characterization.  


 


 Signer Name: Nick Carcamo II, MD   


 Signed: 2022 12:43 AM  


 Workstation Name: [id number]   


 


 


Transcribed By: MELISSA  


Dictated By: NICK CARCAMO II, MD  


Electronically Authenticated By: NICK CARCAMO II, MD    


Signed Date/Time: 22                                


 


 


 


DD/DT: 22                                                            

  


TD/TT:





- Medical Decision Making





77-year-old -American female presents to the emergency room complaining 

of left foot fourth digit toenail discoloration.  Patient denies any pain denies

 any swelling states she is able to move her foot and toes no difficulty 

walking.  She does have a history of diabetes.  This she noticed about 9 days 

ago.








Patient has been diagnosed with a nail injury of the left foot fourth digit.  

Discussed with patient the nail will grow out.  She can follow-up with her 

primary care provider.  She is stable off stable vital signs.


Critical care attestation.: 


If time is entered above; I have spent that time in minutes in the direct care 

of this critically ill patient, excluding procedure time.








ED Disposition


Clinical Impression: 


 Nail, injury by





Disposition: 01 HOME / SELF CARE / HOMELESS


Is pt being admited?: No


Does the pt Need Aspirin: No


Condition: Stable


Additional Instructions: 


You can follow-up with your primary care provider.  This is just a hematoma or 

bruising of the skin under the toenail.


Referrals: 


LAUREEN ACOSTA MD [Primary Care Provider] - 3-5 Days


Forms:  Work/School Release Form(ED)


Time of Disposition: 09:37

## 2022-05-04 NOTE — XRAY REPORT
LEFT TOE(S) 3 VIEW(S)



INDICATION / CLINICAL INFORMATION: TOES BLACK; ANY OSTEOMYELITIS



COMPARISON: None available.

 

FINDINGS:



Narrowing of interphalangeal joints involving multiple toes. Small bony exostoses along distal phalan
x great toe and fifth toe. No acute fractures. No specific osseous findings to suggest acute osteomye
litis.



IMPRESSION:

1. No radiographic findings to clearly suggest osteomyelitis. MRI of the foot may be useful for furth
er characterization.



Signer Name: Melchor Soriano II, MD 

Signed: 5/4/2022 12:43 AM

Workstation Name: [id number]

## 2022-07-22 ENCOUNTER — HOSPITAL ENCOUNTER (EMERGENCY)
Dept: HOSPITAL 5 - ED | Age: 77
LOS: 1 days | Discharge: HOME | End: 2022-07-23
Payer: MEDICARE
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
## 2022-07-22 DIAGNOSIS — I10: ICD-10-CM

## 2022-07-22 DIAGNOSIS — Z88.0: ICD-10-CM

## 2022-07-22 DIAGNOSIS — S39.012A: Primary | ICD-10-CM

## 2022-07-22 DIAGNOSIS — Z88.8: ICD-10-CM
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
## 2022-07-22 DIAGNOSIS — E11.9: ICD-10-CM
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
## 2022-07-22 PROCEDURE — 96372 THER/PROPH/DIAG INJ SC/IM: CPT

## 2022-07-22 PROCEDURE — 99282 EMERGENCY DEPT VISIT SF MDM: CPT

## 2022-07-23 VITALS — DIASTOLIC BLOOD PRESSURE: 79 MMHG | SYSTOLIC BLOOD PRESSURE: 162 MMHG

## 2022-07-23 NOTE — EMERGENCY DEPARTMENT REPORT
ED Back Pain/Injury HPI





- General


Chief Complaint: Extremity Injury, Lower


Stated Complaint: HURT LFT LEG


Time Seen by Provider: 07/23/22 06:11


Source: patient


Limitations: No Limitations





- History of Present Illness


Initial Comments: 


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  Radiating to 

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

causing immediate pain in her low back radiating to the left lower leg.  

Incident happened 2 days ago patient did advise supervisor at work.  Patient now

complains of 4/10 burning tingling sharp pain.  Pain is exacerbated by movement 

bending and twisting.  There is no paralysis.  There has been no decrease or 

loss of bowel or bladder function.  Patient did drive self to ED tonight patient

is amatory with steady gait patient is with no acute distress.  Pain is relieved

by rest.  There is no focal weakness. 





MD Complaint: back injury





- Related Data


                                  Previous Rx's











 Medication  Instructions  Recorded  Last Taken  Type


 


Cetirizine HCl [ZyrTEC 10mg cap] 10 mg PO QDAY #10 capsule 01/24/17 07/11/17 Rx


 


Fluticasone [Flonase] 1 spray NS QDAY #1 bottle 01/24/17 07/11/17 Rx


 


Benzonatate [Tessalon Perle] 100 mg PO TID #15 capsule 12/16/18 Unknown Rx


 


Dexchlorpheniram/Phenylephrine 1 each PO Q6H #20 tab 12/16/18 Unknown Rx





[Rymed Tablet]    


 


Albuterol Mdi (or & Nicu Only) 2 puff IH QID PRN #1 inhalation 02/06/21 Unknown 

Rx





[ProAir HFA Inhaler]    


 


Benzonatate [Tessalon Perles] 100 mg PO Q8HR #10 capsule 02/06/21 Unknown Rx


 


Benzonatate [Tessalon Perles] 100 mg PO Q8HR PRN #12 capsule 03/16/21 Unknown Rx


 


Cetirizine HCl [ZyrTEC 10mg cap] 10 mg PO DAILY #10 capsule 03/16/21 Unknown Rx


 


guaiFENesin/DEXTROMETHORPHAN 1 each PO Q12HR PRN #12 capsule 03/16/21 Unknown Rx





[Coricidin Hbp Chest Charles-Cough]    


 


Dapagliflozin Propanediol [Farxiga] 10 mg PO ONCE #30 tablet 06/25/21 Unknown Rx


 


Glimepiride [Amaryl] 4 mg PO BID 30 Days #60 tablet 06/25/21 Unknown Rx


 


Levothyroxine [Synthroid] 75 mcg PO QAM #30 tablet 06/25/21 Unknown Rx


 


Losartan/Hydrochlorothiazide 1 each PO DAILY #30 tablet 06/25/21 Unknown Rx





[Losartan-Hctz 100-25 mg Tab]    


 


Acetaminophen [Tylenol] 650 mg PO Q8HR PRN #20 capsule 11/12/21 Unknown Rx


 


Acetaminophen [Acetaminophen TAB] 1,000 mg PO Q6HR PRN #60 tablet 07/23/22 

Unknown Rx


 


Capsaicin 0.075% [Zostrix Hp 1 applicatio TP TID PRN #1 tube 07/23/22 Unknown Rx





0.075%]    











                                    Allergies











Allergy/AdvReac Type Severity Reaction Status Date / Time


 


metronidazole [From Flagyl] Allergy  Unknown Verified 05/18/14 23:27


 


Metronidazole HCl Allergy  Unknown Verified 05/18/14 23:27





[From Flagyl]     


 


Penicillins Allergy  Anaphylaxis Verified 01/16/14 02:01














ED Review of Systems


ROS: 


Stated complaint: HURT LFT LEG


Other details as noted in HPI





Constitutional: denies: chills, fever


Eyes: denies: eye pain, eye discharge, vision change


ENT: denies: ear pain, throat pain


Respiratory: denies: cough, shortness of breath, wheezing


Cardiovascular: denies: chest pain, palpitations


Endocrine: no symptoms reported


Gastrointestinal: denies: abdominal pain, nausea, diarrhea


Genitourinary: denies: urgency, dysuria, discharge


Musculoskeletal: back pain, arthralgia, myalgia


Skin: denies: rash, lesions


Neurological: denies: headache, weakness, paresthesias


Psychiatric: denies: anxiety, depression


Hematological/Lymphatic: denies: easy bleeding, easy bruising





ED Past Medical Hx





- Past Medical History


Hx Hypertension: Yes


Hx Diabetes: Yes


Additional medical history: Hypothyroid, Fatty tumors left thigh, hiatal hernia





- Surgical History


Additional Surgical History: R shoulder, hiatal hernia repair, Cataracts





- Social History


Smoking Status: Never Smoker


Substance Use Type: None





- Medications


Home Medications: 


                                Home Medications











 Medication  Instructions  Recorded  Confirmed  Last Taken  Type


 


Cetirizine HCl [ZyrTEC 10mg cap] 10 mg PO QDAY #10 capsule 01/24/17 05/04/22 

07/11/17 Rx


 


Fluticasone [Flonase] 1 spray NS QDAY #1 bottle 01/24/17 05/04/22 07/11/17 Rx


 


Benzonatate [Tessalon Perle] 100 mg PO TID #15 capsule 12/16/18 05/04/22 Unknown

Rx


 


Dexchlorpheniram/Phenylephrine 1 each PO Q6H #20 tab 12/16/18 05/04/22 Unknown 

Rx





[Rymed Tablet]     


 


Albuterol Mdi (or & Nicu Only) 2 puff IH QID PRN #1 inhalation 02/06/21 05/04/22

Unknown Rx





[ProAir HFA Inhaler]     


 


Benzonatate [Tessalon Perles] 100 mg PO Q8HR #10 capsule 02/06/21 05/04/22 

Unknown Rx


 


Benzonatate [Tessalon Perles] 100 mg PO Q8HR PRN #12 capsule 03/16/21 05/04/22 

Unknown Rx


 


Cetirizine HCl [ZyrTEC 10mg cap] 10 mg PO DAILY #10 capsule 03/16/21 05/04/22 

Unknown Rx


 


guaiFENesin/DEXTROMETHORPHAN 1 each PO Q12HR PRN #12 capsule 03/16/21 05/04/22 

Unknown Rx





[Coricidin Hbp Chest Charles-Cough]     


 


Dapagliflozin Propanediol [Farxiga] 10 mg PO ONCE #30 tablet 06/25/21 05/04/22 

Unknown Rx


 


Glimepiride [Amaryl] 4 mg PO BID 30 Days #60 tablet 06/25/21 05/04/22 Unknown Rx


 


Levothyroxine [Synthroid] 75 mcg PO QAM #30 tablet 06/25/21 05/04/22 Unknown Rx


 


Losartan/Hydrochlorothiazide 1 each PO DAILY #30 tablet 06/25/21 05/04/22 

Unknown Rx





[Losartan-Hctz 100-25 mg Tab]     


 


Acetaminophen [Tylenol] 650 mg PO Q8HR PRN #20 capsule 11/12/21 05/04/22 Unknown

 Rx


 


Acetaminophen [Acetaminophen TAB] 1,000 mg PO Q6HR PRN #60 tablet 07/23/22  

Unknown Rx


 


Capsaicin 0.075% [Zostrix Hp 1 applicatio TP TID PRN #1 tube 07/23/22  Unknown 

Rx





0.075%]     














ED Physical Exam





- General


Limitations: No Limitations


General appearance: alert, in no apparent distress





- Head


Head exam: Present: normocephalic, normal inspection





- Eye


Eye exam: Present: normal appearance, PERRL, EOMI


Pupils: Present: normal accommodation





- ENT


ENT exam: Present: mucous membranes moist





- Neck


Neck exam: Present: normal inspection, full ROM.  Absent: tenderness (No 

posterior vertebral point tenderness.  Range of motion intact unrestricted there

is no ecchymosis no crepitus no swelling no step-off.), meningismus, 

lymphadenopathy, thyromegaly





- Respiratory


Respiratory exam: Present: normal lung sounds bilaterally.  Absent: respiratory 

distress, wheezes, stridor, accessory muscle use





- Cardiovascular


Cardiovascular Exam: Present: regular rate, normal rhythm, normal heart sounds. 

Absent: systolic murmur, diastolic murmur, rubs, gallop





- GI/Abdominal


GI/Abdominal exam: Present: soft, normal bowel sounds.  Absent: distended, 

tenderness, guarding, rebound, rigid, bruit, hernia





- Rectal


Rectal exam: Present: deferred





- Extremities Exam


Extremities exam: Present: normal inspection, full ROM, normal capillary refill





- Back Exam


Back exam: Present: normal inspection, full ROM, muscle spasm, paraspinal 

tenderness.  Absent: vertebral tenderness





- Expanded Back Exam


  ** Expanded


Back exam: Absent: saddle anesthesia


Back exam: Sciatic Notch Tenderness: Left, Positive Straight Leg Raise: Left, 

Negative Straight Leg Raising: Right





- Neurological Exam


Neurological exam: Present: alert, oriented X3, CN II-XII intact, reflexes 

normal.  Absent: motor sensory deficit





- Expanded Neurological Exam


  ** Expanded


Patient oriented to: Present: person, place, time


Speech: Present: fluid speech


Motor strength exam: RUE: 5, LUE: 5, RLE: 5, LLE: 5


DTR: knee (R): 1+, knee (L): 1+


Best Eye Response ([place]): (4) open spontaneously


Best Motor Response ([place]): (6) obeys commands


Best Verbal Response ([place]): (5) oriented


[place] Total: 15





- Psychiatric


Psychiatric exam: Present: normal affect, normal mood





- Skin


Skin exam: Present: warm, dry, intact, normal color.  Absent: rash





ED Course





                                   Vital Signs











  07/22/22





  23:37


 


Temperature 98.6 F


 


Pulse Rate 65


 


Respiratory 18





Rate 


 


Blood Pressure 165/77


 


O2 Sat by Pulse 98





Oximetry 














ED Medical Decision Making





- Medical Decision Making


This is a low back strain.  Symptoms are improved with medication given in ED 

plan DC to home, take medications as prescribed.  Moist heat therapy.  Back 

exercises.  Follow-up with your doctor in 2 to 3 days.  Return to emergency 

department should symptoms worsen.  Follow-up with Workmen's Comp. doctor as 

instructed by employer.





Critical care attestation.: 


If time is entered above; I have spent that time in minutes in the direct care 

of this critically ill patient, excluding procedure time.








ED Disposition


Clinical Impression: 


Low back strain


Qualifiers:


 Encounter type: initial encounter Qualified Code(s): S39.012A - Strain of 

muscle, fascia and tendon of lower back, initial encounter





Disposition: 01 HOME / SELF CARE / HOMELESS


Is pt being admited?: No


Does the pt Need Aspirin: No


Condition: Stable


Instructions:  Low Back Sprain or Strain Rehab-SportsMed, Back Injury Prevention


Additional Instructions: 


Take medications as prescribed, moist heat therapy as directed.  Follow-up with 

your doctor in 2 to 3 days.  Return to the emergency department if symptoms 

worsen.


Prescriptions: 


Acetaminophen [Acetaminophen TAB] 1,000 mg PO Q6HR PRN #60 tablet


 PRN Reason: pain


Capsaicin 0.075% [Zostrix Hp 0.075%] 1 applicatio TP TID PRN #1 tube


 PRN Reason: pain


Referrals: 


KAROLINE BRADLEY MD [Staff Physician] - 3-5 Days


Forms:  Work/School Release Form(ED)


Time of Disposition: 06:45

## 2023-03-13 ENCOUNTER — APPOINTMENT (RX ONLY)
Dept: URBAN - METROPOLITAN AREA CLINIC 33 | Facility: CLINIC | Age: 78
Setting detail: DERMATOLOGY
End: 2023-03-13

## 2023-03-13 DIAGNOSIS — L91.0 HYPERTROPHIC SCAR: ICD-10-CM

## 2023-03-13 PROBLEM — D48.5 NEOPLASM OF UNCERTAIN BEHAVIOR OF SKIN: Status: ACTIVE | Noted: 2023-03-13

## 2023-03-13 PROCEDURE — ? BIOPSY BY SHAVE METHOD

## 2023-03-13 PROCEDURE — 69100 BIOPSY OF EXTERNAL EAR: CPT

## 2023-03-13 ASSESSMENT — LOCATION DETAILED DESCRIPTION DERM: LOCATION DETAILED: RIGHT POSTERIOR EAR

## 2023-03-13 ASSESSMENT — LOCATION SIMPLE DESCRIPTION DERM: LOCATION SIMPLE: RIGHT EAR

## 2023-03-13 ASSESSMENT — LOCATION ZONE DERM: LOCATION ZONE: EAR

## 2023-03-13 NOTE — HPI: SKIN LESIONS
How Severe Is Your Skin Lesion?: mild
Have Your Skin Lesions Been Treated?: not been treated
Is This A New Presentation, Or A Follow-Up?: Growth
Additional History: Patient states that lesion first started with itching. Denies any piercing on ear.

## 2024-04-03 ENCOUNTER — APPOINTMENT (RX ONLY)
Dept: URBAN - METROPOLITAN AREA CLINIC 33 | Facility: CLINIC | Age: 79
Setting detail: DERMATOLOGY
End: 2024-04-03

## 2024-04-03 DIAGNOSIS — L82.1 OTHER SEBORRHEIC KERATOSIS: ICD-10-CM

## 2024-04-03 PROCEDURE — 99212 OFFICE O/P EST SF 10 MIN: CPT

## 2024-04-03 PROCEDURE — ? COUNSELING

## 2024-04-03 ASSESSMENT — LOCATION DETAILED DESCRIPTION DERM
LOCATION DETAILED: LEFT INFERIOR CENTRAL MALAR CHEEK
LOCATION DETAILED: RIGHT CENTRAL MALAR CHEEK
LOCATION DETAILED: LEFT INFERIOR LATERAL MALAR CHEEK
LOCATION DETAILED: LEFT CENTRAL MALAR CHEEK
LOCATION DETAILED: RIGHT INFERIOR CENTRAL MALAR CHEEK
LOCATION DETAILED: RIGHT SUPERIOR LATERAL BUCCAL CHEEK

## 2024-04-03 ASSESSMENT — LOCATION ZONE DERM: LOCATION ZONE: FACE

## 2024-04-03 ASSESSMENT — LOCATION SIMPLE DESCRIPTION DERM
LOCATION SIMPLE: LEFT CHEEK
LOCATION SIMPLE: RIGHT CHEEK

## 2024-04-03 NOTE — HPI: SKIN LESION
What Type Of Note Output Would You Prefer (Optional)?: Bullet Format
How Severe Is Your Skin Lesion?: moderate
Has Your Skin Lesion Been Treated?: not been treated
Is This A New Presentation, Or A Follow-Up?: Skin Lesion
Additional History: Patient is present today for mole check on face for years and discuss removal.